# Patient Record
Sex: MALE | Race: WHITE | NOT HISPANIC OR LATINO | Employment: OTHER | URBAN - NONMETROPOLITAN AREA
[De-identification: names, ages, dates, MRNs, and addresses within clinical notes are randomized per-mention and may not be internally consistent; named-entity substitution may affect disease eponyms.]

---

## 2021-07-08 ENCOUNTER — OFFICE VISIT (OUTPATIENT)
Dept: CARDIOLOGY | Facility: CLINIC | Age: 71
End: 2021-07-08

## 2021-07-08 VITALS
WEIGHT: 205.2 LBS | BODY MASS INDEX: 27.79 KG/M2 | HEART RATE: 67 BPM | DIASTOLIC BLOOD PRESSURE: 74 MMHG | HEIGHT: 72 IN | SYSTOLIC BLOOD PRESSURE: 147 MMHG | OXYGEN SATURATION: 98 %

## 2021-07-08 DIAGNOSIS — R06.02 SHORTNESS OF BREATH: Primary | ICD-10-CM

## 2021-07-08 DIAGNOSIS — R60.0 LOWER EXTREMITY EDEMA: ICD-10-CM

## 2021-07-08 DIAGNOSIS — R94.31 ABNORMAL EKG: ICD-10-CM

## 2021-07-08 PROCEDURE — 93000 ELECTROCARDIOGRAM COMPLETE: CPT | Performed by: PHYSICIAN ASSISTANT

## 2021-07-08 PROCEDURE — 99204 OFFICE O/P NEW MOD 45 MIN: CPT | Performed by: PHYSICIAN ASSISTANT

## 2021-07-08 RX ORDER — DILTIAZEM HYDROCHLORIDE 180 MG/1
180 CAPSULE, COATED, EXTENDED RELEASE ORAL DAILY
COMMUNITY

## 2021-07-08 RX ORDER — LANOLIN ALCOHOL/MO/W.PET/CERES
1000 CREAM (GRAM) TOPICAL DAILY
COMMUNITY

## 2021-07-08 RX ORDER — ATORVASTATIN CALCIUM 40 MG/1
40 TABLET, FILM COATED ORAL DAILY
COMMUNITY

## 2021-07-08 RX ORDER — LAMOTRIGINE 150 MG/1
150 TABLET ORAL DAILY
COMMUNITY

## 2021-07-08 NOTE — PATIENT INSTRUCTIONS
Heart-Healthy Eating Plan  Heart-healthy meal planning includes:  · Eating less unhealthy fats.  · Eating more healthy fats.  · Making other changes in your diet.  Talk with your doctor or a diet specialist (dietitian) to create an eating plan that is right for you.  What is my plan?  Your doctor may recommend an eating plan that includes:  · Total fat: ______% or less of total calories a day.  · Saturated fat: ______% or less of total calories a day.  · Cholesterol: less than _________mg a day.  What are tips for following this plan?  Cooking  Avoid frying your food. Try to bake, boil, grill, or broil it instead. You can also reduce fat by:  · Removing the skin from poultry.  · Removing all visible fats from meats.  · Steaming vegetables in water or broth.  Meal planning    · At meals, divide your plate into four equal parts:  ? Fill one-half of your plate with vegetables and green salads.  ? Fill one-fourth of your plate with whole grains.  ? Fill one-fourth of your plate with lean protein foods.  · Eat 4-5 servings of vegetables per day. A serving of vegetables is:  ? 1 cup of raw or cooked vegetables.  ? 2 cups of raw leafy greens.  · Eat 4-5 servings of fruit per day. A serving of fruit is:  ? 1 medium whole fruit.  ? ¼ cup of dried fruit.  ? ½ cup of fresh, frozen, or canned fruit.  ? ½ cup of 100% fruit juice.  · Eat more foods that have soluble fiber. These are apples, broccoli, carrots, beans, peas, and barley. Try to get 20-30 g of fiber per day.  · Eat 4-5 servings of nuts, legumes, and seeds per week:  ? 1 serving of dried beans or legumes equals ½ cup after being cooked.  ? 1 serving of nuts is ¼ cup.  ? 1 serving of seeds equals 1 tablespoon.  General information  · Eat more home-cooked food. Eat less restaurant, buffet, and fast food.  · Limit or avoid alcohol.  · Limit foods that are high in starch and sugar.  · Avoid fried foods.  · Lose weight if you are overweight.  · Keep track of how much salt  (sodium) you eat. This is important if you have high blood pressure. Ask your doctor to tell you more about this.  · Try to add vegetarian meals each week.  Fats  · Choose healthy fats. These include olive oil and canola oil, flaxseeds, walnuts, almonds, and seeds.  · Eat more omega-3 fats. These include salmon, mackerel, sardines, tuna, flaxseed oil, and ground flaxseeds. Try to eat fish at least 2 times each week.  · Check food labels. Avoid foods with trans fats or high amounts of saturated fat.  · Limit saturated fats.  ? These are often found in animal products, such as meats, butter, and cream.  ? These are also found in plant foods, such as palm oil, palm kernel oil, and coconut oil.  · Avoid foods with partially hydrogenated oils in them. These have trans fats. Examples are stick margarine, some tub margarines, cookies, crackers, and other baked goods.  What foods can I eat?  Fruits  All fresh, canned (in natural juice), or frozen fruits.  Vegetables  Fresh or frozen vegetables (raw, steamed, roasted, or grilled). Green salads.  Grains  Most grains. Choose whole wheat and whole grains most of the time. Rice and pasta, including brown rice and pastas made with whole wheat.  Meats and other proteins  Lean, well-trimmed beef, veal, pork, and lamb. Chicken and turkey without skin. All fish and shellfish. Wild duck, rabbit, pheasant, and venison. Egg whites or low-cholesterol egg substitutes. Dried beans, peas, lentils, and tofu. Seeds and most nuts.  Dairy  Low-fat or nonfat cheeses, including ricotta and mozzarella. Skim or 1% milk that is liquid, powdered, or evaporated. Buttermilk that is made with low-fat milk. Nonfat or low-fat yogurt.  Fats and oils  Non-hydrogenated (trans-free) margarines. Vegetable oils, including soybean, sesame, sunflower, olive, peanut, safflower, corn, canola, and cottonseed. Salad dressings or mayonnaise made with a vegetable oil.  Beverages  Mineral water. Coffee and tea. Diet  carbonated beverages.  Sweets and desserts  Sherbet, gelatin, and fruit ice. Small amounts of dark chocolate.  Limit all sweets and desserts.  Seasonings and condiments  All seasonings and condiments.  The items listed above may not be a complete list of foods and drinks you can eat. Contact a dietitian for more options.  What foods should I avoid?  Fruits  Canned fruit in heavy syrup. Fruit in cream or butter sauce. Fried fruit. Limit coconut.  Vegetables  Vegetables cooked in cheese, cream, or butter sauce. Fried vegetables.  Grains  Breads that are made with saturated or trans fats, oils, or whole milk. Croissants. Sweet rolls. Donuts. High-fat crackers, such as cheese crackers.  Meats and other proteins  Fatty meats, such as hot dogs, ribs, sausage, gale, rib-eye roast or steak. High-fat deli meats, such as salami and bologna. Caviar. Domestic duck and goose. Organ meats, such as liver.  Dairy  Cream, sour cream, cream cheese, and creamed cottage cheese. Whole-milk cheeses. Whole or 2% milk that is liquid, evaporated, or condensed. Whole buttermilk. Cream sauce or high-fat cheese sauce. Yogurt that is made from whole milk.  Fats and oils  Meat fat, or shortening. Cocoa butter, hydrogenated oils, palm oil, coconut oil, palm kernel oil. Solid fats and shortenings, including gale fat, salt pork, lard, and butter. Nondairy cream substitutes. Salad dressings with cheese or sour cream.  Beverages  Regular sodas and juice drinks with added sugar.  Sweets and desserts  Frosting. Pudding. Cookies. Cakes. Pies. Milk chocolate or white chocolate. Buttered syrups. Full-fat ice cream or ice cream drinks.  The items listed above may not be a complete list of foods and drinks to avoid. Contact a dietitian for more information.  Summary  · Heart-healthy meal planning includes eating less unhealthy fats, eating more healthy fats, and making other changes in your diet.  · Eat a balanced diet. This includes fruits and  vegetables, low-fat or nonfat dairy, lean protein, nuts and legumes, whole grains, and heart-healthy oils and fats.  This information is not intended to replace advice given to you by your health care provider. Make sure you discuss any questions you have with your health care provider.  Document Revised: 02/21/2019 Document Reviewed: 01/25/2019  [employer] Patient Education © 2021 [employer]

## 2021-07-08 NOTE — PROGRESS NOTES
Subjective   Jevon Cotto is a 71 y.o. male     Chief Complaint   Patient presents with   • Establish Care   • Edema       HPI    Patient is a 71-year-old male that presents to the office for evaluation.  No history of coronary artery disease or structural heart disease.    Patient has been having symptoms.  He has not been complaining of any chest pain or pressure but does have a degree of dyspnea when trying to exert or do activity.  Even activities on a flat surface, he has been dyspneic.  He went to see primary and apparently had an abnormal EKG.  Was recommended to consider evaluation    He does not describe PND orthopnea.    He does not describe palpitating having dysrhythmic symptoms.  Otherwise appears stable              Patient brought in medicine list to appointment, it's been reviewed with patient and med list was updated in the chart.     Current Outpatient Medications   Medication Sig Dispense Refill   • atorvastatin (LIPITOR) 40 MG tablet Take 40 mg by mouth Daily.     • dilTIAZem CD (CARDIZEM CD) 180 MG 24 hr capsule Take 180 mg by mouth Daily.     • lamoTRIgine (LaMICtal) 150 MG tablet Take 150 mg by mouth Daily.     • vitamin B-12 (CYANOCOBALAMIN) 1000 MCG tablet Take 1,000 mcg by mouth Daily.       No current facility-administered medications for this visit.       Patient has no known allergies.    Past Medical History:   Diagnosis Date   • Hyperlipidemia    • Hypertension    • Kidney stones    • Seizures (CMS/HCC)    • Sleep apnea        Social History     Socioeconomic History   • Marital status:      Spouse name: Not on file   • Number of children: Not on file   • Years of education: Not on file   • Highest education level: Not on file   Tobacco Use   • Smoking status: Never Smoker   • Smokeless tobacco: Never Used   Substance and Sexual Activity   • Alcohol use: Yes     Comment: occas socially   • Drug use: Never   • Sexual activity: Defer       Family History   Problem Relation Age of  "Onset   • No Known Problems Mother    • No Known Problems Father        Review of Systems   Constitutional: Positive for fatigue. Negative for chills and fever.   HENT: Negative for congestion, rhinorrhea and sore throat.    Eyes: Positive for visual disturbance (glasses).   Respiratory: Positive for apnea (uses cpap) and shortness of breath. Negative for chest tightness.    Cardiovascular: Positive for leg swelling (L leg). Negative for chest pain and palpitations.   Gastrointestinal: Negative.    Endocrine: Negative.    Genitourinary: Negative.    Musculoskeletal: Positive for arthralgias and back pain. Negative for gait problem, neck pain and neck stiffness.   Skin: Negative.  Negative for rash and wound.   Allergic/Immunologic: Negative.  Negative for environmental allergies and food allergies.   Neurological: Positive for seizures (last seizure 02/15/21), weakness, numbness (feet) and headaches. Negative for dizziness and light-headedness.   Hematological: Bruises/bleeds easily.   Psychiatric/Behavioral: Positive for sleep disturbance.       Objective   Vitals:    07/08/21 1026   BP: 147/74   BP Location: Left arm   Patient Position: Sitting   Pulse: 67   SpO2: 98%   Weight: 93.1 kg (205 lb 3.2 oz)   Height: 182.9 cm (72\")      /74 (BP Location: Left arm, Patient Position: Sitting)   Pulse 67   Ht 182.9 cm (72\")   Wt 93.1 kg (205 lb 3.2 oz)   SpO2 98%   BMI 27.83 kg/m²     Lab Results (most recent)     None          Physical Exam  Vitals and nursing note reviewed.   Constitutional:       General: He is not in acute distress.     Appearance: Normal appearance. He is well-developed.   HENT:      Head: Normocephalic and atraumatic.   Eyes:      General: No scleral icterus.        Right eye: No discharge.         Left eye: No discharge.      Conjunctiva/sclera: Conjunctivae normal.   Neck:      Vascular: No carotid bruit.   Cardiovascular:      Rate and Rhythm: Normal rate and regular rhythm.      Heart " sounds: Normal heart sounds. No murmur heard.   No friction rub. No gallop.    Pulmonary:      Effort: Pulmonary effort is normal. No respiratory distress.      Breath sounds: Normal breath sounds. No wheezing or rales.   Chest:      Chest wall: No tenderness.   Musculoskeletal:      Right lower leg: No edema.      Left lower leg: No edema.   Skin:     General: Skin is warm and dry.      Coloration: Skin is not pale.      Findings: No erythema or rash.   Neurological:      Mental Status: He is alert and oriented to person, place, and time.      Cranial Nerves: No cranial nerve deficit.   Psychiatric:         Behavior: Behavior normal.         Procedure     ECG 12 Lead    Date/Time: 7/8/2021 10:18 AM  Performed by: Alphonso Carranza PA  Authorized by: Alphonso Carranza PA   Comparison: not compared with previous ECG   Comments: EKG demonstrates sinus rhythm at 68 bpm, right axis deviation, nonspecific IVCD, nonspecific ST abnormality in the inferior lateral leads                 Assessment/Plan     Problems Addressed this Visit        Cardiac and Vasculature    Abnormal EKG    Relevant Orders    Adult Transthoracic Echo Complete W/ Cont if Necessary Per Protocol    Stress Test With Myocardial Perfusion One Day       Pulmonary and Pneumonias    Shortness of breath - Primary    Relevant Orders    Adult Transthoracic Echo Complete W/ Cont if Necessary Per Protocol    Stress Test With Myocardial Perfusion One Day       Symptoms and Signs    Lower extremity edema    Relevant Orders    Adult Transthoracic Echo Complete W/ Cont if Necessary Per Protocol    Stress Test With Myocardial Perfusion One Day      Diagnoses       Codes Comments    Shortness of breath    -  Primary ICD-10-CM: R06.02  ICD-9-CM: 786.05     Lower extremity edema     ICD-10-CM: R60.0  ICD-9-CM: 782.3     Abnormal EKG     ICD-10-CM: R94.31  ICD-9-CM: 794.31         Recommendation  1.  Patient is a 71-year-old male with complaints of dyspnea and had a  degree of lower extremity edema with abnormal EKG.  I would like to schedule testing    2.  Stress test for an ischemia assessment    3.  With history of lower extremity and edema, dyspnea and abnormal EKG, echo to assess LV systolic and diastolic function assess valvular primaries etc.    4.  Otherwise we will see him back for follow-up after the above testing.  Follow-up with primary as scheduled        Advance Care Planning   ACP discussion was held with the patient during this visit. Patient has an advance directive (not in EMR), copy requested.         Electronically signed by:

## 2021-07-27 ENCOUNTER — HOSPITAL ENCOUNTER (OUTPATIENT)
Dept: CARDIOLOGY | Facility: HOSPITAL | Age: 71
Discharge: HOME | End: 2021-07-27

## 2021-07-27 DIAGNOSIS — R94.31 ABNORMAL EKG: ICD-10-CM

## 2021-07-27 DIAGNOSIS — R60.0 LOWER EXTREMITY EDEMA: ICD-10-CM

## 2021-07-27 DIAGNOSIS — R06.02 SHORTNESS OF BREATH: ICD-10-CM

## 2021-07-27 LAB
BH CV NUCLEAR PRIOR STUDY: 3
BH CV REST NUCLEAR ISOTOPE DOSE: 10 MCI
BH CV STRESS BP STAGE 1: NORMAL
BH CV STRESS COMMENTS STAGE 1: NORMAL
BH CV STRESS DOSE REGADENOSON STAGE 1: 0.4
BH CV STRESS DURATION MIN STAGE 1: 0
BH CV STRESS DURATION SEC STAGE 1: 10
BH CV STRESS HR STAGE 1: 80
BH CV STRESS NUCLEAR ISOTOPE DOSE: 30 MCI
BH CV STRESS PROTOCOL 1: NORMAL
BH CV STRESS RECOVERY BP: NORMAL MMHG
BH CV STRESS RECOVERY HR: 74 BPM
BH CV STRESS STAGE 1: 1
MAXIMAL PREDICTED HEART RATE: 149 BPM
PERCENT MAX PREDICTED HR: 53.69 %
STRESS BASELINE BP: NORMAL MMHG
STRESS BASELINE HR: 72 BPM
STRESS PERCENT HR: 63 %
STRESS POST PEAK BP: NORMAL MMHG
STRESS POST PEAK HR: 80 BPM
STRESS TARGET HR: 127 BPM

## 2021-07-27 PROCEDURE — 78452 HT MUSCLE IMAGE SPECT MULT: CPT

## 2021-07-27 PROCEDURE — 25010000002 REGADENOSON 0.4 MG/5ML SOLUTION: Performed by: INTERNAL MEDICINE

## 2021-07-27 PROCEDURE — 93018 CV STRESS TEST I&R ONLY: CPT | Performed by: INTERNAL MEDICINE

## 2021-07-27 PROCEDURE — 0 TECHNETIUM SESTAMIBI: Performed by: INTERNAL MEDICINE

## 2021-07-27 PROCEDURE — 93017 CV STRESS TEST TRACING ONLY: CPT

## 2021-07-27 PROCEDURE — A9500 TC99M SESTAMIBI: HCPCS | Performed by: INTERNAL MEDICINE

## 2021-07-27 PROCEDURE — 78452 HT MUSCLE IMAGE SPECT MULT: CPT | Performed by: INTERNAL MEDICINE

## 2021-07-27 PROCEDURE — 93016 CV STRESS TEST SUPVJ ONLY: CPT | Performed by: NURSE PRACTITIONER

## 2021-07-27 RX ADMIN — REGADENOSON 0.4 MG: 0.08 INJECTION, SOLUTION INTRAVENOUS at 13:36

## 2021-07-27 RX ADMIN — TECHNETIUM TC 99M SESTAMIBI 1 DOSE: 1 INJECTION INTRAVENOUS at 12:24

## 2021-07-27 RX ADMIN — TECHNETIUM TC 99M SESTAMIBI 1 DOSE: 1 INJECTION INTRAVENOUS at 13:36

## 2021-07-28 ENCOUNTER — TELEPHONE (OUTPATIENT)
Dept: CARDIOLOGY | Facility: CLINIC | Age: 71
End: 2021-07-28

## 2021-07-28 NOTE — TELEPHONE ENCOUNTER
Spoke with patient regarding results. Verbalized understanding and has no further questions at this time. YURI, CMA

## 2021-08-12 ENCOUNTER — APPOINTMENT (OUTPATIENT)
Dept: CARDIOLOGY | Facility: HOSPITAL | Age: 71
End: 2021-08-12

## 2021-08-13 ENCOUNTER — HOSPITAL ENCOUNTER (OUTPATIENT)
Dept: CARDIOLOGY | Facility: HOSPITAL | Age: 71
Discharge: HOME | End: 2021-08-13
Admitting: PHYSICIAN ASSISTANT

## 2021-08-13 DIAGNOSIS — R06.02 SHORTNESS OF BREATH: ICD-10-CM

## 2021-08-13 DIAGNOSIS — R94.31 ABNORMAL EKG: ICD-10-CM

## 2021-08-13 DIAGNOSIS — R60.0 LOWER EXTREMITY EDEMA: ICD-10-CM

## 2021-08-13 PROCEDURE — 93306 TTE W/DOPPLER COMPLETE: CPT | Performed by: INTERNAL MEDICINE

## 2021-08-13 PROCEDURE — 93306 TTE W/DOPPLER COMPLETE: CPT

## 2021-08-24 ENCOUNTER — APPOINTMENT (OUTPATIENT)
Dept: CARDIOLOGY | Facility: HOSPITAL | Age: 71
End: 2021-08-24

## 2021-08-25 LAB
BH CV ECHO MEAS - ACS: 2 CM
BH CV ECHO MEAS - AO MAX PG: 4 MMHG
BH CV ECHO MEAS - AO MEAN PG: 3 MMHG
BH CV ECHO MEAS - AO ROOT AREA (BSA CORRECTED): 1.5
BH CV ECHO MEAS - AO ROOT AREA: 8.3 CM^2
BH CV ECHO MEAS - AO ROOT DIAM: 3.3 CM
BH CV ECHO MEAS - AO V2 MAX: 100 CM/SEC
BH CV ECHO MEAS - AO V2 MEAN: 75.8 CM/SEC
BH CV ECHO MEAS - AO V2 VTI: 22.7 CM
BH CV ECHO MEAS - BSA(HAYCOCK): 2.2 M^2
BH CV ECHO MEAS - BSA: 2.2 M^2
BH CV ECHO MEAS - BZI_BMI: 27.8 KILOGRAMS/M^2
BH CV ECHO MEAS - BZI_METRIC_HEIGHT: 182.9 CM
BH CV ECHO MEAS - BZI_METRIC_WEIGHT: 93 KG
BH CV ECHO MEAS - EDV(CUBED): 139 ML
BH CV ECHO MEAS - EDV(MOD-SP4): 165 ML
BH CV ECHO MEAS - EDV(TEICH): 128.4 ML
BH CV ECHO MEAS - EF(CUBED): 69.9 %
BH CV ECHO MEAS - EF(MOD-SP4): 57.3 %
BH CV ECHO MEAS - EF(TEICH): 61.2 %
BH CV ECHO MEAS - EF_3D-VOL: 59 %
BH CV ECHO MEAS - ESV(CUBED): 41.8 ML
BH CV ECHO MEAS - ESV(MOD-SP4): 70.4 ML
BH CV ECHO MEAS - ESV(TEICH): 49.8 ML
BH CV ECHO MEAS - FS: 33 %
BH CV ECHO MEAS - IVS/LVPW: 0.86
BH CV ECHO MEAS - IVSD: 1.3 CM
BH CV ECHO MEAS - LA DIMENSION: 4.5 CM
BH CV ECHO MEAS - LA/AO: 1.4
BH CV ECHO MEAS - LV DIASTOLIC VOL/BSA (35-75): 76.6 ML/M^2
BH CV ECHO MEAS - LV IVRT: 0.1 SEC
BH CV ECHO MEAS - LV MASS(C)D: 306.2 GRAMS
BH CV ECHO MEAS - LV MASS(C)DI: 142.2 GRAMS/M^2
BH CV ECHO MEAS - LV SYSTOLIC VOL/BSA (12-30): 32.7 ML/M^2
BH CV ECHO MEAS - LVIDD: 5.2 CM
BH CV ECHO MEAS - LVIDS: 3.5 CM
BH CV ECHO MEAS - LVLD AP4: 8.9 CM
BH CV ECHO MEAS - LVLS AP4: 7.6 CM
BH CV ECHO MEAS - LVOT AREA (M): 4.5 CM^2
BH CV ECHO MEAS - LVOT AREA: 4.5 CM^2
BH CV ECHO MEAS - LVOT DIAM: 2.4 CM
BH CV ECHO MEAS - LVPWD: 1.5 CM
BH CV ECHO MEAS - MV A MAX VEL: 56.1 CM/SEC
BH CV ECHO MEAS - MV DEC SLOPE: 417 CM/SEC^2
BH CV ECHO MEAS - MV E MAX VEL: 91.3 CM/SEC
BH CV ECHO MEAS - MV E/A: 1.6
BH CV ECHO MEAS - RVDD: 2.9 CM
BH CV ECHO MEAS - SI(AO): 87.5 ML/M^2
BH CV ECHO MEAS - SI(CUBED): 45.2 ML/M^2
BH CV ECHO MEAS - SI(MOD-SP4): 43.9 ML/M^2
BH CV ECHO MEAS - SI(TEICH): 36.5 ML/M^2
BH CV ECHO MEAS - SV(AO): 188.3 ML
BH CV ECHO MEAS - SV(CUBED): 97.2 ML
BH CV ECHO MEAS - SV(MOD-SP4): 94.6 ML
BH CV ECHO MEAS - SV(TEICH): 78.5 ML
MAXIMAL PREDICTED HEART RATE: 149 BPM
STRESS TARGET HR: 127 BPM

## 2021-08-26 ENCOUNTER — TELEPHONE (OUTPATIENT)
Dept: CARDIOLOGY | Facility: CLINIC | Age: 71
End: 2021-08-26

## 2021-08-26 NOTE — TELEPHONE ENCOUNTER
Spoke with patient, confirmed normal results and patient has follow up appointment schedule and advised to keep it. Mary Kate Dailey RMA.     ----- Message from Maura Tyson LPN sent at 8/26/2021  8:17 AM EDT -----  ----- Message -----   From: Alphonso Carranza PA   Sent: 8/25/2021   9:25 AM EDT   To: Kary Escobar LPN     Routine follow-up      Adult Transthoracic Echo Complete W/ Cont if Necessary Per Protocol  Order: [id number]  Status:  Final result   Visible to patient:  No (not released) Dx:  Shortness of breath; Lower extremity ...  1 Result Note  Details      Reading Physician Reading Date Result Priority  Gold Manriquez MD  [phone] 8/13/2021 Routine    Result Text  This study is technically limited by poor parasternal acoustic windows but is felt suitable for interpretation.     1.  LV size, function, and wall motion are grossly normal.  Visually estimated ejection fraction is 50 to 55%.  3D ejection fraction is 59%.  Mild concentric left ventricular hypertrophy with grade 2 diastolic dysfunction.  Mild to moderate left atrial enlargement.  Right heart chambers are normal.  No septal defect or intracavitary mass or thrombus.     2.  The mitral valve is morphologically normal with no mitral stenosis and trivial to mild MR.  The aortic valve is structurally normal with no AAS or AI.  There is physiologic TR from a normal-appearing valve.     3.  No pericardial or great vessel pathology.     4.  Pulmonary artery pressures cannot be calculated.      ----- Message -----  From: Kary Escobar LPN  Sent: 8/25/2021  10:26 AM EDT  To: Maura Tyson LPN      ----- Message -----  From: Alphonso Carranza PA  Sent: 8/25/2021   9:25 AM EDT  To: Kary Escobar LPN    Routine follow-up

## 2021-09-15 ENCOUNTER — OFFICE VISIT (OUTPATIENT)
Dept: CARDIOLOGY | Facility: CLINIC | Age: 71
End: 2021-09-15

## 2021-09-15 VITALS
HEIGHT: 72 IN | HEART RATE: 70 BPM | WEIGHT: 206 LBS | OXYGEN SATURATION: 97 % | SYSTOLIC BLOOD PRESSURE: 168 MMHG | DIASTOLIC BLOOD PRESSURE: 93 MMHG | BODY MASS INDEX: 27.9 KG/M2

## 2021-09-15 DIAGNOSIS — G40.909 SEIZURE DISORDER (HCC): Primary | ICD-10-CM

## 2021-09-15 DIAGNOSIS — R06.02 SHORTNESS OF BREATH: ICD-10-CM

## 2021-09-15 DIAGNOSIS — R60.0 LOWER EXTREMITY EDEMA: ICD-10-CM

## 2021-09-15 PROCEDURE — 99214 OFFICE O/P EST MOD 30 MIN: CPT | Performed by: PHYSICIAN ASSISTANT

## 2021-09-15 NOTE — PROGRESS NOTES
Problem list     Subjective   Jevon Cotto is a 71 y.o. male     Chief Complaint   Patient presents with   • Testing follow up     Stress and Echo on 8/13/21       HPI    Patient is a 71-year-old male who presents to the office for routine assessment and follow-up.  He initially presented to the office in the setting of significant exertional dyspnea and testing was ordered to rule out the potential cardiac etiology.  Patient describes dyspnea when exerting for extended periods.  Stress test with no evidence of ischemia preserved LV function.  Echo suggested good LV function with grade 2 diastolic dysfunction no significant valvular disease no effusion.    He has done remarkably well.  No chest pain or pressure.  He has a degree of mild dyspnea when exerting.  It is not severe but he was to exert for an extended level on a flat surface, he can be short of breath.  This has progressed over a period of time.  He does not describe PND orthopnea.    He does not palpitate or have dysrhythmic symptoms.  He does have a mild degree of lower extremity edema but has injury to his lower extremity but otherwise is feeling well        Current Outpatient Medications on File Prior to Visit   Medication Sig Dispense Refill   • atorvastatin (LIPITOR) 40 MG tablet Take 40 mg by mouth Daily.     • dilTIAZem CD (CARDIZEM CD) 180 MG 24 hr capsule Take 180 mg by mouth Daily.     • lamoTRIgine (LaMICtal) 150 MG tablet Take 150 mg by mouth Daily.     • vitamin B-12 (CYANOCOBALAMIN) 1000 MCG tablet Take 1,000 mcg by mouth Daily.       No current facility-administered medications on file prior to visit.       Patient has no known allergies.    Past Medical History:   Diagnosis Date   • Hyperlipidemia    • Hypertension    • Kidney stones    • Seizures (CMS/HCC)    • Sleep apnea        Social History     Socioeconomic History   • Marital status:      Spouse name: Not on file   • Number of children: Not on file   • Years of education:  "Not on file   • Highest education level: Not on file   Tobacco Use   • Smoking status: Never Smoker   • Smokeless tobacco: Never Used   Substance and Sexual Activity   • Alcohol use: Yes     Comment: occas socially   • Drug use: Never   • Sexual activity: Defer       Family History   Problem Relation Age of Onset   • No Known Problems Mother    • No Known Problems Father        Review of Systems   Constitutional: Positive for fatigue. Negative for chills and fever.   HENT: Negative.  Negative for congestion, sinus pressure and sore throat.    Eyes: Positive for visual disturbance (glasses).   Respiratory: Positive for apnea and shortness of breath (at times). Negative for chest tightness.    Cardiovascular: Positive for leg swelling. Negative for chest pain and palpitations.   Gastrointestinal: Positive for constipation (at times). Negative for abdominal pain, blood in stool, diarrhea, nausea and vomiting.   Endocrine: Negative for cold intolerance and heat intolerance.   Genitourinary: Negative.  Negative for dysuria, frequency, hematuria and urgency.   Musculoskeletal: Negative.  Negative for arthralgias, back pain and neck pain.   Skin: Positive for wound (lower right leg).   Allergic/Immunologic: Negative.  Negative for environmental allergies and food allergies.   Neurological: Positive for light-headedness (when up moving around at times). Negative for dizziness and syncope.   Hematological: Bruises/bleeds easily.   Psychiatric/Behavioral: Positive for sleep disturbance (CPAP, denies waking wtih soa or cp).       Objective   Vitals:    09/15/21 0932   BP: 168/93   BP Location: Left arm   Patient Position: Sitting   Pulse: 70   SpO2: 97%   Weight: 93.4 kg (206 lb)   Height: 182.9 cm (72\")      /93 (BP Location: Left arm, Patient Position: Sitting)   Pulse 70   Ht 182.9 cm (72\")   Wt 93.4 kg (206 lb)   SpO2 97%   BMI 27.94 kg/m²     Lab Results (most recent)     None          Physical Exam  Vitals and " nursing note reviewed.   Constitutional:       General: He is not in acute distress.     Appearance: Normal appearance. He is well-developed.   HENT:      Head: Normocephalic and atraumatic.   Eyes:      General: No scleral icterus.        Right eye: No discharge.         Left eye: No discharge.      Conjunctiva/sclera: Conjunctivae normal.   Neck:      Vascular: No carotid bruit.   Cardiovascular:      Rate and Rhythm: Normal rate and regular rhythm.      Heart sounds: Normal heart sounds. No murmur heard.   No friction rub. No gallop.    Pulmonary:      Effort: Pulmonary effort is normal. No respiratory distress.      Breath sounds: Normal breath sounds. No wheezing or rales.   Chest:      Chest wall: No tenderness.   Musculoskeletal:      Right lower leg: Edema present.      Left lower leg: No edema.   Skin:     General: Skin is warm and dry.      Coloration: Skin is not pale.      Findings: No erythema or rash.   Neurological:      Mental Status: He is alert and oriented to person, place, and time.      Cranial Nerves: No cranial nerve deficit.   Psychiatric:         Behavior: Behavior normal.         Procedure   Procedures       Assessment/Plan     Problems Addressed this Visit        Neuro    Seizure disorder (CMS/[facility]) - Primary    Relevant Orders    Ambulatory Referral to Neurology       Pulmonary and Pneumonias    Shortness of breath       Symptoms and Signs    Lower extremity edema      Diagnoses       Codes Comments    Seizure disorder (CMS/[facility])    -  Primary ICD-10-CM: G40.909  ICD-9-CM: 345.90     Lower extremity edema     ICD-10-CM: R60.0  ICD-9-CM: 782.3     Shortness of breath     ICD-10-CM: R06.02  ICD-9-CM: 786.05           Recommendation  1.  Patient is a 71-year-old male who presents to the office for evaluation that is doing well with stress that showed no evidence of ischemia.  Grade 2 diastolic dysfunction otherwise largely normal echo.  He has minimal to mild lower extremity edema.  Patient  had an injury to his lower extremity edema in which she had a significant laceration requiring repair.  He developed cellulitis and required IV antibiotics.  He has no severe lower extremity edema but mild edema that seems to be attributable to other causes.  We can monitor for now    2.  Patient has history of seizure disorder would like referral to local neurology.  Per his request, we will make referral to Dr. Rivera    3.  Otherwise dyspnea is mild.  We did discuss possible pulmonary evaluation but he will monitor symptoms for now.  We will see him back for follow-up in 6 months to year or sooner if symptoms were to progress or change as discussed.  Follow-up with primary as scheduled         Patient did not bring med list or medicine bottles to appointment, med list has been reviewed and updated based on patient's knowledge of their meds.     Advance Care Planning   ACP discussion was held with the patient during this visit. Patient has an advance directive (not in EMR), copy requested.         Electronically signed by:

## 2021-09-15 NOTE — PATIENT INSTRUCTIONS

## 2022-09-15 ENCOUNTER — OFFICE VISIT (OUTPATIENT)
Dept: CARDIOLOGY | Facility: CLINIC | Age: 72
End: 2022-09-15

## 2022-09-15 VITALS
BODY MASS INDEX: 26.01 KG/M2 | DIASTOLIC BLOOD PRESSURE: 87 MMHG | SYSTOLIC BLOOD PRESSURE: 151 MMHG | WEIGHT: 192 LBS | HEART RATE: 74 BPM | HEIGHT: 72 IN | OXYGEN SATURATION: 98 %

## 2022-09-15 DIAGNOSIS — R06.02 SHORTNESS OF BREATH: ICD-10-CM

## 2022-09-15 DIAGNOSIS — E78.5 DYSLIPIDEMIA: ICD-10-CM

## 2022-09-15 DIAGNOSIS — R60.0 LOWER EXTREMITY EDEMA: Primary | ICD-10-CM

## 2022-09-15 PROCEDURE — 99213 OFFICE O/P EST LOW 20 MIN: CPT | Performed by: PHYSICIAN ASSISTANT

## 2022-09-15 PROCEDURE — 93000 ELECTROCARDIOGRAM COMPLETE: CPT | Performed by: PHYSICIAN ASSISTANT

## 2022-09-15 RX ORDER — DIPHENOXYLATE HYDROCHLORIDE AND ATROPINE SULFATE 2.5; .025 MG/1; MG/1
TABLET ORAL 3 TIMES DAILY
COMMUNITY

## 2022-09-15 RX ORDER — MELATONIN
1000 DAILY
COMMUNITY

## 2022-09-15 NOTE — PROGRESS NOTES
"Problem list     Subjective   Jevon Cotto is a 72 y.o. male     Chief Complaint   Patient presents with   • Follow-up     1 year   • Seizure disorder   • Shortness of Breath   • Edema     L knee       HPI    Patient is a 72-year-old male who presents back to the office for follow-up.  He was seen last year and apparently had testing to include stress test which was negative for ischemia and echocardiogram which was largely unremarkable with exception of mild mitral regurgitation and grade 2 diastolic dysfunction.  LV function was estimated as normal    Patient has felt remarkably well without any chest pain or pressure at all.  He can be mildly dyspneic and has issues when he \"overexerts\".  Otherwise, his dyspnea is very minimal.  He does not describe any PND or orthopnea.    He does not sense palpitations.  He does not describe any dysrhythmic symptoms.  Otherwise feels well at this point      Current Outpatient Medications on File Prior to Visit   Medication Sig Dispense Refill   • atorvastatin (LIPITOR) 40 MG tablet Take 40 mg by mouth Daily.     • cholecalciferol (VITAMIN D3) 25 MCG (1000 UT) tablet Take 1,000 Units by mouth Daily.     • dilTIAZem CD (CARDIZEM CD) 180 MG 24 hr capsule Take 180 mg by mouth Daily.     • lamoTRIgine (LaMICtal) 150 MG tablet Take 150 mg by mouth Daily.     • multivitamin (multivitamin) tablet tablet Take  by mouth 3 (Three) Times a Day.     • vitamin B-12 (CYANOCOBALAMIN) 1000 MCG tablet Take 1,000 mcg by mouth Daily.       No current facility-administered medications on file prior to visit.       Patient has no known allergies.    Past Medical History:   Diagnosis Date   • Hyperlipidemia    • Hypertension    • Kidney stones    • Seizures (HCC)    • Sleep apnea        Social History     Socioeconomic History   • Marital status:    Tobacco Use   • Smoking status: Never Smoker   • Smokeless tobacco: Never Used   Vaping Use   • Vaping Use: Never used   Substance and Sexual " "Activity   • Alcohol use: Yes     Comment: occas socially   • Drug use: Never   • Sexual activity: Defer       Family History   Problem Relation Age of Onset   • No Known Problems Mother    • No Known Problems Father        Review of Systems   Constitutional: Negative.  Negative for appetite change, chills, fatigue, fever and unexpected weight change.   HENT: Negative.  Negative for congestion, ear pain, sinus pressure, sinus pain, sneezing and sore throat.    Eyes: Negative.  Negative for pain, discharge, redness, itching and visual disturbance.   Respiratory: Positive for shortness of breath. Negative for cough, chest tightness and wheezing.    Cardiovascular: Negative.  Negative for chest pain, palpitations and leg swelling (L knee).   Gastrointestinal: Positive for nausea (W/seizures). Negative for abdominal pain, blood in stool, constipation, diarrhea, rectal pain and vomiting.   Endocrine: Negative.    Genitourinary: Negative.  Negative for hematuria.   Musculoskeletal: Negative.  Negative for back pain, joint swelling, neck pain and neck stiffness.   Skin: Negative.    Allergic/Immunologic: Negative.    Neurological: Positive for seizures. Negative for dizziness, syncope, weakness, light-headedness and headaches.   Hematological: Negative.    Psychiatric/Behavioral: Positive for sleep disturbance (Pt wears a CPAP but only gets about 4 hours of restful sleep).       Objective   Vitals:    09/15/22 0944   BP: 151/87   BP Location: Left arm   Patient Position: Sitting   Cuff Size: Adult   Pulse: 74   SpO2: 98%   Weight: 87.1 kg (192 lb)   Height: 182.9 cm (72\")      /87 (BP Location: Left arm, Patient Position: Sitting, Cuff Size: Adult)   Pulse 74   Ht 182.9 cm (72\")   Wt 87.1 kg (192 lb)   SpO2 98%   BMI 26.04 kg/m²     Lab Results (most recent)     None          Physical Exam  Vitals and nursing note reviewed.   Constitutional:       General: He is not in acute distress.     Appearance: Normal " appearance. He is well-developed.   HENT:      Head: Normocephalic and atraumatic.   Eyes:      General: No scleral icterus.        Right eye: No discharge.         Left eye: No discharge.      Conjunctiva/sclera: Conjunctivae normal.   Neck:      Vascular: No carotid bruit.   Cardiovascular:      Rate and Rhythm: Normal rate and regular rhythm.      Heart sounds: Normal heart sounds. No murmur heard.    No friction rub. No gallop.   Pulmonary:      Effort: Pulmonary effort is normal. No respiratory distress.      Breath sounds: Normal breath sounds. No wheezing or rales.   Chest:      Chest wall: No tenderness.   Musculoskeletal:      Right lower leg: No edema.      Left lower leg: No edema.   Skin:     General: Skin is warm and dry.      Coloration: Skin is not pale.      Findings: No erythema or rash.   Neurological:      Mental Status: He is alert and oriented to person, place, and time.      Cranial Nerves: No cranial nerve deficit.   Psychiatric:         Behavior: Behavior normal.         Procedure     ECG 12 Lead    Date/Time: 9/15/2022 9:45 AM  Performed by: Alphonso Carranza PA  Authorized by: Alphonso Carranza PA   Comparison: compared with previous ECG from 7/8/2021  Comments: EKG demonstrates sinus bradycardia 59 bpm with first-degree block at 216 ms and no acute ST changes               Assessment & Plan     Problems Addressed this Visit        Cardiac and Vasculature    Dyslipidemia       Pulmonary and Pneumonias    Shortness of breath       Symptoms and Signs    Lower extremity edema - Primary      Diagnoses       Codes Comments    Lower extremity edema    -  Primary ICD-10-CM: R60.0  ICD-9-CM: 782.3     Shortness of breath     ICD-10-CM: R06.02  ICD-9-CM: 786.05     Dyslipidemia     ICD-10-CM: E78.5  ICD-9-CM: 272.4         Recommendation  1.  Patient is a 72-year-old male who presents to the office for evaluation.  Patient feels remarkably well at this point.  Patient does not complain chest pain  or pressure.  No ischemic symptoms.  For now, I would recommend continue medical management    2.  Patient with mild dyspnea but it is only present with high levels of activity.  With good LV function and no evidence of ischemia, feel is reasonable to continue current treatment.    3.  Patient with dyslipidemia currently on statin therapy.  We will continue at this point.  In the absence of symptoms, recommend follow-up in 1 year or sooner as symptoms discussed.  Follow-up with primary as scheduled                Electronically signed by:

## 2023-01-30 ENCOUNTER — TELEPHONE (OUTPATIENT)
Dept: CARDIOLOGY | Facility: CLINIC | Age: 73
End: 2023-01-30
Payer: MEDICARE

## 2023-01-30 NOTE — TELEPHONE ENCOUNTER
Clotilde from [facility] left message to report patient experienced dizziness and vomiting this morning. She is treating him for knee replacement. She checked his heart rate and felt it skip a couple times. She is concerned he may need an EKG.    Left message for Clotilde to call with blood pressure and marrufo rate.     Called patient. He states he may have a stomach bug but has experienced dizziness and nausea since starting pain pills prescribed for knee replacement. He reports BP/HR were normal when checked earlier today. He states he is doing well. He denies shortness of breath, chest pain or palpitations. He does not feel he needs an EKG and would not be able to come in for one anyway. He will he will call with any concerns. Message routed to Alphonso Carranza as LILI.

## 2023-09-18 ENCOUNTER — OFFICE VISIT (OUTPATIENT)
Dept: CARDIOLOGY | Facility: CLINIC | Age: 73
End: 2023-09-18
Payer: MEDICARE

## 2023-09-18 VITALS
WEIGHT: 190 LBS | DIASTOLIC BLOOD PRESSURE: 75 MMHG | HEART RATE: 75 BPM | SYSTOLIC BLOOD PRESSURE: 129 MMHG | BODY MASS INDEX: 25.73 KG/M2 | HEIGHT: 72 IN | OXYGEN SATURATION: 96 %

## 2023-09-18 DIAGNOSIS — R94.31 ABNORMAL EKG: ICD-10-CM

## 2023-09-18 DIAGNOSIS — R60.0 LOWER EXTREMITY EDEMA: Primary | ICD-10-CM

## 2023-09-18 DIAGNOSIS — R06.02 SHORTNESS OF BREATH: ICD-10-CM

## 2023-09-18 PROCEDURE — 93000 ELECTROCARDIOGRAM COMPLETE: CPT | Performed by: PHYSICIAN ASSISTANT

## 2023-09-18 PROCEDURE — 99214 OFFICE O/P EST MOD 30 MIN: CPT | Performed by: PHYSICIAN ASSISTANT

## 2023-09-18 RX ORDER — LOSARTAN POTASSIUM AND HYDROCHLOROTHIAZIDE 12.5; 5 MG/1; MG/1
1 TABLET ORAL DAILY
Qty: 30 TABLET | Refills: 5 | Status: SHIPPED | OUTPATIENT
Start: 2023-09-18

## 2023-09-18 NOTE — LETTER
September 18, 2023       No Recipients    Patient: Jevon Cotto   YOB: 1950   Date of Visit: 9/18/2023       Dear RADHA Roberts    Jevon Cotto was in my office today. Below is a copy of my note.    If you have questions, please do not hesitate to call me. I look forward to following Jevon along with you.         Sincerely,        JIMY Cali        CC:   No Recipients    Problem list     Subjective  Jevon Cotto is a 73 y.o. male     Chief Complaint   Patient presents with   • Follow-up     YEARLY   Problem list  1.  Low risk stress test August 2021  2.  Preserved systolic function  3.  Grade 2 diastolic dysfunction  4.  Lower extremity edema  5.  Hypertension  6.  Dyslipidemia    HPI    Patient is a 83-year-old male who presents back to the office for routine cardiac follow-up.  He has done remarkably well since his last visit.  He does not describe chest pain or pressure.    He does have a degree of dyspnea when trying to exert or do activity.  It is not severe.  It is noticeable when trying to exert or do activity for an extended period.  He does not describe PND or orthopnea.    He does describe lower extremity edema.  He has felt lower extremity edema that has worsened.  He seems to be worse as the day progresses.  Otherwise, he has done well.  He feels well at this time.      Current Outpatient Medications on File Prior to Visit   Medication Sig Dispense Refill   • atorvastatin (LIPITOR) 40 MG tablet Take 1 tablet by mouth Daily.     • cholecalciferol (VITAMIN D3) 25 MCG (1000 UT) tablet Take 1 tablet by mouth Daily.     • lamoTRIgine (LaMICtal) 150 MG tablet Take 1 tablet by mouth Daily.     • multivitamin (THERAGRAN) tablet tablet Take  by mouth 3 (Three) Times a Day.     • vitamin B-12 (CYANOCOBALAMIN) 1000 MCG tablet Take 1 tablet by mouth Daily.     • [DISCONTINUED] dilTIAZem CD (CARDIZEM CD) 180 MG 24 hr capsule Take 1 capsule by mouth Daily.       No current  "facility-administered medications on file prior to visit.       Patient has no known allergies.    Past Medical History:   Diagnosis Date   • Hyperlipidemia    • Hypertension    • Kidney stones    • Seizures    • Sleep apnea        Social History     Socioeconomic History   • Marital status:    Tobacco Use   • Smoking status: Never   • Smokeless tobacco: Never   Vaping Use   • Vaping Use: Never used   Substance and Sexual Activity   • Alcohol use: Yes     Comment: occas socially   • Drug use: Never   • Sexual activity: Defer       Family History   Problem Relation Age of Onset   • No Known Problems Mother    • No Known Problems Father        Review of Systems   HENT: Negative.     Eyes:  Negative for visual disturbance.   Respiratory:  Positive for apnea and shortness of breath. Negative for cough, chest tightness and wheezing.    Cardiovascular:  Positive for leg swelling. Negative for chest pain and palpitations.   Gastrointestinal: Negative.  Negative for blood in stool.   Endocrine: Negative.    Genitourinary: Negative.  Negative for hematuria.   Musculoskeletal: Negative.    Skin: Negative.  Negative for color change, rash and wound.   Allergic/Immunologic: Negative.    Neurological:  Negative for dizziness, syncope, weakness, light-headedness, numbness and headaches.   Hematological: Negative.  Does not bruise/bleed easily.   Psychiatric/Behavioral: Negative.  Negative for sleep disturbance.      Objective  Vitals:    09/18/23 0916   BP: 129/75   BP Location: Left arm   Patient Position: Sitting   Cuff Size: Adult   Pulse: 75   SpO2: 96%   Weight: 86.2 kg (190 lb)   Height: 182.9 cm (72\")      /75 (BP Location: Left arm, Patient Position: Sitting, Cuff Size: Adult)   Pulse 75   Ht 182.9 cm (72\")   Wt 86.2 kg (190 lb)   SpO2 96%   BMI 25.77 kg/m²     Lab Results (most recent)       None            Physical Exam  Vitals and nursing note reviewed.   Constitutional:       General: He is not in " acute distress.     Appearance: Normal appearance. He is well-developed.   HENT:      Head: Normocephalic and atraumatic.   Eyes:      General: No scleral icterus.        Right eye: No discharge.         Left eye: No discharge.      Conjunctiva/sclera: Conjunctivae normal.   Neck:      Vascular: No carotid bruit.   Cardiovascular:      Rate and Rhythm: Normal rate and regular rhythm.      Heart sounds: Normal heart sounds. No murmur heard.    No friction rub. No gallop.   Pulmonary:      Effort: Pulmonary effort is normal. No respiratory distress.      Breath sounds: Normal breath sounds. No wheezing or rales.   Chest:      Chest wall: No tenderness.   Musculoskeletal:      Right lower leg: Edema present.      Left lower leg: Edema present.   Skin:     General: Skin is warm and dry.      Coloration: Skin is not pale.      Findings: No erythema or rash.   Neurological:      Mental Status: He is alert and oriented to person, place, and time.      Cranial Nerves: No cranial nerve deficit.   Psychiatric:         Behavior: Behavior normal.       Procedure    ECG 12 Lead    Date/Time: 9/18/2023 9:09 AM  Performed by: Alphonso Carranza PA  Authorized by: Alphonso Carranza PA   Comparison: compared with previous ECG from 9/15/2022  Comments: EKG demonstrates sinus rhythm at 69 bpm, first-degree AV block at 234 ms, possible septal MI age-indeterminate with nonspecific ST abnormality at baseline           Assessment & Plan    Problems Addressed this Visit          Cardiac and Vasculature    Abnormal EKG    Relevant Orders    Stress Test With Myocardial Perfusion One Day       Pulmonary and Pneumonias    Shortness of breath    Relevant Orders    Stress Test With Myocardial Perfusion One Day       Symptoms and Signs    Lower extremity edema - Primary    Relevant Orders    Stress Test With Myocardial Perfusion One Day     Diagnoses         Codes Comments    Lower extremity edema    -  Primary ICD-10-CM: R60.0  ICD-9-CM: 782.3      Shortness of breath     ICD-10-CM: R06.02  ICD-9-CM: 786.05     Abnormal EKG     ICD-10-CM: R94.31  ICD-9-CM: 794.31             Recommendation  1.  Patient is a 73-year-old male who presents to the office for evaluation.  Patient has had lower extremity edema.  Some of this could be related to venous insufficiency.  Patient is on calcium channel blocker as well.  Also, he has grade 2 diastolic dysfunction and is on a calcium channel blocker.  I would like to stop diltiazem.  He has first-degree AV block on EKG anyway.  I am putting him on losartan with hydrochlorothiazide combination and want him to call back in 1 to 2 weeks in regards to symptom check.    2.  Because of EKG change in level of dyspnea with an EKG change, I am scheduling stress test to evaluate and rule out ischemia as contributing factor.    3.  Otherwise, he appears to be doing well.  He appears stable.  We will see him back for follow-up after testing and recommend further.  Follow-up with primary as scheduled.         Patient did not bring med list or medicine bottles to appointment, med list has been reviewed and updated based on patient's knowledge of their meds.      Advance Care Planning   ACP discussion was declined by the patient. Patient has an advance directive (not in EMR), copy requested.      Electronically signed by:

## 2023-09-18 NOTE — PROGRESS NOTES
Problem list     Subjective   Jevon Cotto is a 73 y.o. male     Chief Complaint   Patient presents with    Follow-up     YEARLY   Problem list  1.  Low risk stress test August 2021  2.  Preserved systolic function  3.  Grade 2 diastolic dysfunction  4.  Lower extremity edema  5.  Hypertension  6.  Dyslipidemia    HPI    Patient is a 83-year-old male who presents back to the office for routine cardiac follow-up.  He has done remarkably well since his last visit.  He does not describe chest pain or pressure.    He does have a degree of dyspnea when trying to exert or do activity.  It is not severe.  It is noticeable when trying to exert or do activity for an extended period.  He does not describe PND or orthopnea.    He does describe lower extremity edema.  He has felt lower extremity edema that has worsened.  He seems to be worse as the day progresses.  Otherwise, he has done well.  He feels well at this time.      Current Outpatient Medications on File Prior to Visit   Medication Sig Dispense Refill    atorvastatin (LIPITOR) 40 MG tablet Take 1 tablet by mouth Daily.      cholecalciferol (VITAMIN D3) 25 MCG (1000 UT) tablet Take 1 tablet by mouth Daily.      lamoTRIgine (LaMICtal) 150 MG tablet Take 1 tablet by mouth Daily.      multivitamin (THERAGRAN) tablet tablet Take  by mouth 3 (Three) Times a Day.      vitamin B-12 (CYANOCOBALAMIN) 1000 MCG tablet Take 1 tablet by mouth Daily.      [DISCONTINUED] dilTIAZem CD (CARDIZEM CD) 180 MG 24 hr capsule Take 1 capsule by mouth Daily.       No current facility-administered medications on file prior to visit.       Patient has no known allergies.    Past Medical History:   Diagnosis Date    Hyperlipidemia     Hypertension     Kidney stones     Seizures     Sleep apnea        Social History     Socioeconomic History    Marital status:    Tobacco Use    Smoking status: Never    Smokeless tobacco: Never   Vaping Use    Vaping Use: Never used   Substance and Sexual  "Activity    Alcohol use: Yes     Comment: occas socially    Drug use: Never    Sexual activity: Defer       Family History   Problem Relation Age of Onset    No Known Problems Mother     No Known Problems Father        Review of Systems   HENT: Negative.     Eyes:  Negative for visual disturbance.   Respiratory:  Positive for apnea and shortness of breath. Negative for cough, chest tightness and wheezing.    Cardiovascular:  Positive for leg swelling. Negative for chest pain and palpitations.   Gastrointestinal: Negative.  Negative for blood in stool.   Endocrine: Negative.    Genitourinary: Negative.  Negative for hematuria.   Musculoskeletal: Negative.    Skin: Negative.  Negative for color change, rash and wound.   Allergic/Immunologic: Negative.    Neurological:  Negative for dizziness, syncope, weakness, light-headedness, numbness and headaches.   Hematological: Negative.  Does not bruise/bleed easily.   Psychiatric/Behavioral: Negative.  Negative for sleep disturbance.      Objective   Vitals:    09/18/23 0916   BP: 129/75   BP Location: Left arm   Patient Position: Sitting   Cuff Size: Adult   Pulse: 75   SpO2: 96%   Weight: 86.2 kg (190 lb)   Height: 182.9 cm (72\")      /75 (BP Location: Left arm, Patient Position: Sitting, Cuff Size: Adult)   Pulse 75   Ht 182.9 cm (72\")   Wt 86.2 kg (190 lb)   SpO2 96%   BMI 25.77 kg/m²     Lab Results (most recent)       None            Physical Exam  Vitals and nursing note reviewed.   Constitutional:       General: He is not in acute distress.     Appearance: Normal appearance. He is well-developed.   HENT:      Head: Normocephalic and atraumatic.   Eyes:      General: No scleral icterus.        Right eye: No discharge.         Left eye: No discharge.      Conjunctiva/sclera: Conjunctivae normal.   Neck:      Vascular: No carotid bruit.   Cardiovascular:      Rate and Rhythm: Normal rate and regular rhythm.      Heart sounds: Normal heart sounds. No murmur " heard.    No friction rub. No gallop.   Pulmonary:      Effort: Pulmonary effort is normal. No respiratory distress.      Breath sounds: Normal breath sounds. No wheezing or rales.   Chest:      Chest wall: No tenderness.   Musculoskeletal:      Right lower leg: Edema present.      Left lower leg: Edema present.   Skin:     General: Skin is warm and dry.      Coloration: Skin is not pale.      Findings: No erythema or rash.   Neurological:      Mental Status: He is alert and oriented to person, place, and time.      Cranial Nerves: No cranial nerve deficit.   Psychiatric:         Behavior: Behavior normal.       Procedure     ECG 12 Lead    Date/Time: 9/18/2023 9:09 AM  Performed by: Alphonso Carranza PA  Authorized by: Alphonso Carranza PA   Comparison: compared with previous ECG from 9/15/2022  Comments: EKG demonstrates sinus rhythm at 69 bpm, first-degree AV block at 234 ms, possible septal MI age-indeterminate with nonspecific ST abnormality at baseline           Assessment & Plan     Problems Addressed this Visit          Cardiac and Vasculature    Abnormal EKG    Relevant Orders    Stress Test With Myocardial Perfusion One Day       Pulmonary and Pneumonias    Shortness of breath    Relevant Orders    Stress Test With Myocardial Perfusion One Day       Symptoms and Signs    Lower extremity edema - Primary    Relevant Orders    Stress Test With Myocardial Perfusion One Day     Diagnoses         Codes Comments    Lower extremity edema    -  Primary ICD-10-CM: R60.0  ICD-9-CM: 782.3     Shortness of breath     ICD-10-CM: R06.02  ICD-9-CM: 786.05     Abnormal EKG     ICD-10-CM: R94.31  ICD-9-CM: 794.31             Recommendation  1.  Patient is a 73-year-old male who presents to the office for evaluation.  Patient has had lower extremity edema.  Some of this could be related to venous insufficiency.  Patient is on calcium channel blocker as well.  Also, he has grade 2 diastolic dysfunction and is on a calcium  channel blocker.  I would like to stop diltiazem.  He has first-degree AV block on EKG anyway.  I am putting him on losartan with hydrochlorothiazide combination and want him to call back in 1 to 2 weeks in regards to symptom check.    2.  Because of EKG change in level of dyspnea with an EKG change, I am scheduling stress test to evaluate and rule out ischemia as contributing factor.    3.  Otherwise, he appears to be doing well.  He appears stable.  We will see him back for follow-up after testing and recommend further.  Follow-up with primary as scheduled.         Patient did not bring med list or medicine bottles to appointment, med list has been reviewed and updated based on patient's knowledge of their meds.      Advance Care Planning   ACP discussion was declined by the patient. Patient has an advance directive (not in EMR), copy requested.      Electronically signed by:

## 2023-10-06 ENCOUNTER — HOSPITAL ENCOUNTER (OUTPATIENT)
Dept: CARDIOLOGY | Facility: HOSPITAL | Age: 73
Discharge: HOME | End: 2023-10-06
Payer: MEDICARE

## 2023-10-06 DIAGNOSIS — R94.31 ABNORMAL EKG: ICD-10-CM

## 2023-10-06 DIAGNOSIS — R60.0 LOWER EXTREMITY EDEMA: ICD-10-CM

## 2023-10-06 DIAGNOSIS — R06.02 SHORTNESS OF BREATH: ICD-10-CM

## 2023-10-06 LAB
BH CV REST NUCLEAR ISOTOPE DOSE: 10 MCI
BH CV STRESS COMMENTS STAGE 1: NORMAL
BH CV STRESS DOSE REGADENOSON STAGE 1: 0.4
BH CV STRESS DURATION MIN STAGE 1: 0
BH CV STRESS DURATION SEC STAGE 1: 10
BH CV STRESS NUCLEAR ISOTOPE DOSE: 30 MCI
BH CV STRESS PROTOCOL 1: NORMAL
BH CV STRESS RECOVERY BP: NORMAL MMHG
BH CV STRESS RECOVERY HR: 65 BPM
BH CV STRESS STAGE 1: 1
MAXIMAL PREDICTED HEART RATE: 147 BPM
PERCENT MAX PREDICTED HR: 46.94 %
STRESS BASELINE BP: NORMAL MMHG
STRESS BASELINE HR: 58 BPM
STRESS PERCENT HR: 55 %
STRESS POST PEAK BP: NORMAL MMHG
STRESS POST PEAK HR: 69 BPM
STRESS TARGET HR: 125 BPM

## 2023-10-06 PROCEDURE — 78452 HT MUSCLE IMAGE SPECT MULT: CPT

## 2023-10-06 PROCEDURE — 0 TECHNETIUM SESTAMIBI: Performed by: INTERNAL MEDICINE

## 2023-10-06 PROCEDURE — 25010000002 REGADENOSON 0.4 MG/5ML SOLUTION: Performed by: INTERNAL MEDICINE

## 2023-10-06 PROCEDURE — A9500 TC99M SESTAMIBI: HCPCS | Performed by: INTERNAL MEDICINE

## 2023-10-06 PROCEDURE — 93017 CV STRESS TEST TRACING ONLY: CPT

## 2023-10-06 RX ORDER — REGADENOSON 0.08 MG/ML
0.4 INJECTION, SOLUTION INTRAVENOUS
Status: COMPLETED | OUTPATIENT
Start: 2023-10-06 | End: 2023-10-06

## 2023-10-06 RX ADMIN — TECHNETIUM TC 99M SESTAMIBI 1 DOSE: 1 INJECTION INTRAVENOUS at 11:13

## 2023-10-06 RX ADMIN — TECHNETIUM TC 99M SESTAMIBI 1 DOSE: 1 INJECTION INTRAVENOUS at 09:56

## 2023-10-06 RX ADMIN — REGADENOSON 0.4 MG: 0.08 INJECTION, SOLUTION INTRAVENOUS at 11:12

## 2023-10-10 ENCOUNTER — TELEPHONE (OUTPATIENT)
Dept: CARDIOLOGY | Facility: CLINIC | Age: 73
End: 2023-10-10
Payer: MEDICARE

## 2023-10-10 NOTE — TELEPHONE ENCOUNTER
Tried to contact patient, no answer left VM to return call.    Patient aware will be called with jody time and date.    Minnie Holden MA        ----- Message from JIMY Moreno sent at 10/10/2023 10:11 AM EDT -----  Follow-up 1 week    Result Text  1.  Scintigraphy is suggestive of inferior wall ischemia with a mild increase in count densities on rest versus stress images.  However, this could be due, at least in part, to count averaging from some diaphragmatic uptake on rest images.  Polar map showed no statistically significant reversible defects.     2.  Moderate to severely depressed post-rest ejection fraction of 32% with global hypokinesis most pronounced in the inferobasilar segment, the inferolateral and lateral walls.     3.  No evidence of pharmacologically induced transient ischemic dilation or of increased lung uptake of radiopharmaceutical.

## 2023-10-17 ENCOUNTER — OFFICE VISIT (OUTPATIENT)
Dept: CARDIOLOGY | Facility: CLINIC | Age: 73
End: 2023-10-17
Payer: MEDICARE

## 2023-10-17 ENCOUNTER — LAB (OUTPATIENT)
Dept: CARDIOLOGY | Facility: CLINIC | Age: 73
End: 2023-10-17
Payer: MEDICARE

## 2023-10-17 VITALS
BODY MASS INDEX: 25.87 KG/M2 | WEIGHT: 191 LBS | HEIGHT: 72 IN | OXYGEN SATURATION: 100 % | DIASTOLIC BLOOD PRESSURE: 70 MMHG | HEART RATE: 77 BPM | SYSTOLIC BLOOD PRESSURE: 118 MMHG

## 2023-10-17 DIAGNOSIS — I10 ESSENTIAL HYPERTENSION: ICD-10-CM

## 2023-10-17 DIAGNOSIS — R06.00 DYSPNEA, UNSPECIFIED TYPE: ICD-10-CM

## 2023-10-17 DIAGNOSIS — I42.0 CARDIOMYOPATHY, DILATED: Primary | ICD-10-CM

## 2023-10-17 DIAGNOSIS — I42.0 CARDIOMYOPATHY, DILATED: ICD-10-CM

## 2023-10-17 DIAGNOSIS — I73.9 INTERMITTENT CLAUDICATION: ICD-10-CM

## 2023-10-17 DIAGNOSIS — R94.39 ABNORMAL STRESS TEST: ICD-10-CM

## 2023-10-17 LAB
ALBUMIN SERPL-MCNC: 3.4 G/DL (ref 3.5–5.2)
ALBUMIN/GLOB SERPL: 0.7 G/DL
ALP SERPL-CCNC: 100 U/L (ref 39–117)
ALT SERPL W P-5'-P-CCNC: 45 U/L (ref 1–41)
ANION GAP SERPL CALCULATED.3IONS-SCNC: 9.3 MMOL/L (ref 5–15)
AST SERPL-CCNC: 39 U/L (ref 1–40)
BASOPHILS # BLD AUTO: 0.05 10*3/MM3 (ref 0–0.2)
BASOPHILS NFR BLD AUTO: 1 % (ref 0–1.5)
BILIRUB SERPL-MCNC: 0.4 MG/DL (ref 0–1.2)
BUN SERPL-MCNC: 17 MG/DL (ref 8–23)
BUN/CREAT SERPL: 16.2 (ref 7–25)
CALCIUM SPEC-SCNC: 9.2 MG/DL (ref 8.6–10.5)
CHLORIDE SERPL-SCNC: 101 MMOL/L (ref 98–107)
CO2 SERPL-SCNC: 25.7 MMOL/L (ref 22–29)
CREAT SERPL-MCNC: 1.05 MG/DL (ref 0.76–1.27)
DEPRECATED RDW RBC AUTO: 51.8 FL (ref 37–54)
EGFRCR SERPLBLD CKD-EPI 2021: 75 ML/MIN/1.73
EOSINOPHIL # BLD AUTO: 0.07 10*3/MM3 (ref 0–0.4)
EOSINOPHIL NFR BLD AUTO: 1.4 % (ref 0.3–6.2)
ERYTHROCYTE [DISTWIDTH] IN BLOOD BY AUTOMATED COUNT: 14 % (ref 12.3–15.4)
GLOBULIN UR ELPH-MCNC: 5 GM/DL
GLUCOSE SERPL-MCNC: 98 MG/DL (ref 65–99)
HCT VFR BLD AUTO: 42.8 % (ref 37.5–51)
HGB BLD-MCNC: 14.1 G/DL (ref 13–17.7)
IMM GRANULOCYTES # BLD AUTO: 0.01 10*3/MM3 (ref 0–0.05)
IMM GRANULOCYTES NFR BLD AUTO: 0.2 % (ref 0–0.5)
LYMPHOCYTES # BLD AUTO: 1.18 10*3/MM3 (ref 0.7–3.1)
LYMPHOCYTES NFR BLD AUTO: 24.1 % (ref 19.6–45.3)
MCH RBC QN AUTO: 33.1 PG (ref 26.6–33)
MCHC RBC AUTO-ENTMCNC: 32.9 G/DL (ref 31.5–35.7)
MCV RBC AUTO: 100.5 FL (ref 79–97)
MONOCYTES # BLD AUTO: 0.71 10*3/MM3 (ref 0.1–0.9)
MONOCYTES NFR BLD AUTO: 14.5 % (ref 5–12)
NEUTROPHILS NFR BLD AUTO: 2.88 10*3/MM3 (ref 1.7–7)
NEUTROPHILS NFR BLD AUTO: 58.8 % (ref 42.7–76)
NRBC BLD AUTO-RTO: 0 /100 WBC (ref 0–0.2)
PLATELET # BLD AUTO: 228 10*3/MM3 (ref 140–450)
PMV BLD AUTO: 10.8 FL (ref 6–12)
POTASSIUM SERPL-SCNC: 3.8 MMOL/L (ref 3.5–5.2)
PROT SERPL-MCNC: 8.4 G/DL (ref 6–8.5)
RBC # BLD AUTO: 4.26 10*6/MM3 (ref 4.14–5.8)
SODIUM SERPL-SCNC: 136 MMOL/L (ref 136–145)
WBC NRBC COR # BLD: 4.9 10*3/MM3 (ref 3.4–10.8)

## 2023-10-17 PROCEDURE — 99214 OFFICE O/P EST MOD 30 MIN: CPT | Performed by: PHYSICIAN ASSISTANT

## 2023-10-17 PROCEDURE — 3074F SYST BP LT 130 MM HG: CPT | Performed by: PHYSICIAN ASSISTANT

## 2023-10-17 PROCEDURE — 3078F DIAST BP <80 MM HG: CPT | Performed by: PHYSICIAN ASSISTANT

## 2023-10-17 PROCEDURE — 85025 COMPLETE CBC W/AUTO DIFF WBC: CPT | Performed by: PHYSICIAN ASSISTANT

## 2023-10-17 PROCEDURE — 80053 COMPREHEN METABOLIC PANEL: CPT | Performed by: PHYSICIAN ASSISTANT

## 2023-10-17 RX ORDER — ASPIRIN 81 MG/1
81 TABLET ORAL DAILY
Qty: 30 TABLET | Refills: 5 | Status: SHIPPED | OUTPATIENT
Start: 2023-10-17

## 2023-10-17 NOTE — PROGRESS NOTES
Problem list     Subjective   Jevon Cotto is a 73 y.o. male     Chief Complaint   Patient presents with    ABNORMAL TESTING     Problem list  1.  Low risk stress test August 2021  1.1 stress test September 2023 with past medical inferior wall ischemia with severely depressed systolic function at 32% s   2.  Dilated cardiomyopathy  3.  Grade 2 diastolic dysfunction  4.  Lower extremity edema  5.  Hypertension  6.  Dyslipidemia    HPI    Patient is a 73 male who presents back to the office for follow-up.    Patient had symptoms last visit of significant exertional dyspnea.  He does not complain of any discomfort but significant dyspnea.  EKG was abnormal with some ST abnormalities.  This prompted cardiac testing to be scheduled.  He underwent stress testing with results as above.  Last visit, we stopped his diltiazem and transition to losartan with hydrochlorothiazide combination because of his edema.    He continues to be without chest pain.  He feels significant dyspnea when exerting he still has noticed this and it has been concerning for the patient.  He had significant lower extremity edema but that has improved with medication adjustment.  He does not describe PND or orthopnea.    He does not palpitate nor does he complain of dysrhythmic symptoms.  His main complaint appears to be his level of dyspnea.  He does not describe having any history of lung disease at all.  His functional status has declined to a degree.  He is stable otherwise.      Current Outpatient Medications on File Prior to Visit   Medication Sig Dispense Refill    atorvastatin (LIPITOR) 40 MG tablet Take 1 tablet by mouth Daily.      cholecalciferol (VITAMIN D3) 25 MCG (1000 UT) tablet Take 1 tablet by mouth Daily.      lamoTRIgine (LaMICtal) 150 MG tablet Take 1 tablet by mouth Daily.      losartan-hydrochlorothiazide (HYZAAR) 50-12.5 MG per tablet Take 1 tablet by mouth Daily. 30 tablet 5    multivitamin (THERAGRAN) tablet tablet Take  by  "mouth 3 (Three) Times a Day.      [DISCONTINUED] vitamin B-12 (CYANOCOBALAMIN) 1000 MCG tablet Take 1 tablet by mouth Daily.       No current facility-administered medications on file prior to visit.       Patient has no known allergies.    Past Medical History:   Diagnosis Date    Hyperlipidemia     Hypertension     Kidney stones     Seizures     Sleep apnea        Social History     Socioeconomic History    Marital status:    Tobacco Use    Smoking status: Never    Smokeless tobacco: Never   Vaping Use    Vaping Use: Never used   Substance and Sexual Activity    Alcohol use: Yes     Comment: occas socially    Drug use: Never    Sexual activity: Defer       Family History   Problem Relation Age of Onset    No Known Problems Mother     No Known Problems Father        Review of Systems   Constitutional: Negative.    HENT: Negative.     Eyes: Negative.    Respiratory:  Positive for shortness of breath. Negative for apnea, cough, chest tightness and wheezing.    Cardiovascular:  Negative for chest pain, palpitations and leg swelling.   Gastrointestinal:  Negative for blood in stool.   Endocrine: Negative.    Genitourinary: Negative.  Negative for hematuria.   Musculoskeletal: Negative.    Skin: Negative.  Negative for color change, rash and wound.   Allergic/Immunologic: Negative.    Neurological:  Negative for dizziness, syncope, weakness, light-headedness, numbness and headaches.   Hematological: Negative.  Does not bruise/bleed easily.   Psychiatric/Behavioral: Negative.  Negative for sleep disturbance.        Objective   Vitals:    10/17/23 0846   BP: 118/70   Pulse: 77   SpO2: 100%   Weight: 86.6 kg (191 lb)   Height: 182.9 cm (72\")      /70   Pulse 77   Ht 182.9 cm (72\")   Wt 86.6 kg (191 lb)   SpO2 100%   BMI 25.90 kg/m²     Lab Results (most recent)       None            Physical Exam  Vitals and nursing note reviewed.   Constitutional:       General: He is not in acute distress.     " Appearance: Normal appearance. He is well-developed.   HENT:      Head: Normocephalic and atraumatic.   Eyes:      General: No scleral icterus.        Right eye: No discharge.         Left eye: No discharge.      Conjunctiva/sclera: Conjunctivae normal.   Neck:      Vascular: No carotid bruit.   Cardiovascular:      Rate and Rhythm: Normal rate and regular rhythm.      Heart sounds: Normal heart sounds. No murmur heard.     No friction rub. No gallop.   Pulmonary:      Effort: Pulmonary effort is normal. No respiratory distress.      Breath sounds: Normal breath sounds. No wheezing or rales.   Chest:      Chest wall: No tenderness.   Musculoskeletal:      Right lower leg: No edema.      Left lower leg: No edema.   Skin:     General: Skin is warm and dry.      Coloration: Skin is not pale.      Findings: No erythema or rash.   Neurological:      Mental Status: He is alert and oriented to person, place, and time.      Cranial Nerves: No cranial nerve deficit.   Psychiatric:         Behavior: Behavior normal.         Procedure   Procedures       Assessment & Plan     Problems Addressed this Visit          Cardiac and Vasculature    Essential hypertension    Relevant Orders    [facility]    CBC & Differential    Comprehensive Metabolic Panel    Adult Transthoracic Echo Complete W/ Cont if Necessary Per Protocol    Duplex Lower Extremity Art / Grafts - Bilateral CAR    Cardiomyopathy, dilated - Primary    Relevant Orders    [facility]    CBC & Differential    Comprehensive Metabolic Panel    Adult Transthoracic Echo Complete W/ Cont if Necessary Per Protocol    Duplex Lower Extremity Art / Grafts - Bilateral CAR    Abnormal stress test    Relevant Orders    [facility]    CBC & Differential    Comprehensive Metabolic Panel    Adult Transthoracic Echo Complete W/ Cont if Necessary Per Protocol    Duplex Lower Extremity Art / Grafts - Bilateral CAR       Symptoms and Signs    Dyspnea     Relevant Orders    [facility] Cath    CBC & Differential    Comprehensive Metabolic Panel    Adult Transthoracic Echo Complete W/ Cont if Necessary Per Protocol    Duplex Lower Extremity Art / Grafts - Bilateral CAR     Other Visit Diagnoses       Intermittent claudication        Relevant Orders    Duplex Lower Extremity Art / Grafts - Bilateral CAR          Diagnoses         Codes Comments    Cardiomyopathy, dilated    -  Primary ICD-10-CM: I42.0  ICD-9-CM: 425.4     Dyspnea, unspecified type     ICD-10-CM: R06.00  ICD-9-CM: 786.09     Essential hypertension     ICD-10-CM: I10  ICD-9-CM: 401.9     Abnormal stress test     ICD-10-CM: R94.39  ICD-9-CM: 794.39     Intermittent claudication     ICD-10-CM: I73.9  ICD-9-CM: 443.9             Recommendation  1.  Patient is a 73-year-old male who presents to the office to be evaluated.  He has new onset cardiomyopathy at least by stress test with a questionable inferior wall ischemic defect in a patient with significant dyspnea in the absence of any lung disease.  Because of abnormality on testing with continued symptoms, we would like to evaluate coronary anatomy to see if ischemia is contributing factor of his cardiomyopathy as intervention could potentially improve his LV function.    2.  In the interim, his blood pressure is 118/70 mmHg.  I would like to add beta-blocker therapy but I would be hesitant to do that because of his pressure.  I am wanting to get an echocardiogram to confirm systolic dysfunction.  We can make further adjustments pending patient's response and findings on echocardiogram.    3.  Patient complains of significant leg discomfort.  He notices this when trying to walk or do activity and feels significant coldness to the lower extremities.  I would like to schedule arterial duplex to evaluate further.    4.  I want him to be on aspirin daily with his statin.  He has no chest pain at this time.  We will try to schedule catheterization and see  him back for follow-up to recommend further.  Follow-up with primary as scheduled.       Patient did not bring med list or medicine bottles to appointment, med list has been reviewed and updated based on patient's knowledge of their meds.      Advance Care Planning   ACP discussion was declined by the patient. Patient has an advance directive (not in EMR), copy requested.      Electronically signed by:

## 2023-10-17 NOTE — LETTER
October 17, 2023       No Recipients    Patient: Jevon Cotto   YOB: 1950   Date of Visit: 10/17/2023       Dear RADHA Roberts    Jevon Cotto was in my office today. Below is a copy of my note.    If you have questions, please do not hesitate to call me. I look forward to following Jevon along with you.         Sincerely,        JIMY Cali        CC:   No Recipients    Problem list     Subjective  Jevon Cotto is a 73 y.o. male     Chief Complaint   Patient presents with   • ABNORMAL TESTING     Problem list  1.  Low risk stress test August 2021  1.1 stress test September 2023 with past medical inferior wall ischemia with severely depressed systolic function at 32% s   2.  Dilated cardiomyopathy  3.  Grade 2 diastolic dysfunction  4.  Lower extremity edema  5.  Hypertension  6.  Dyslipidemia    HPI    Patient is a 73 male who presents back to the office for follow-up.    Patient had symptoms last visit of significant exertional dyspnea.  He does not complain of any discomfort but significant dyspnea.  EKG was abnormal with some ST abnormalities.  This prompted cardiac testing to be scheduled.  He underwent stress testing with results as above.  Last visit, we stopped his diltiazem and transition to losartan with hydrochlorothiazide combination because of his edema.    He continues to be without chest pain.  He feels significant dyspnea when exerting he still has noticed this and it has been concerning for the patient.  He had significant lower extremity edema but that has improved with medication adjustment.  He does not describe PND or orthopnea.    He does not palpitate nor does he complain of dysrhythmic symptoms.  His main complaint appears to be his level of dyspnea.  He does not describe having any history of lung disease at all.  His functional status has declined to a degree.  He is stable otherwise.      Current Outpatient Medications on File Prior to Visit   Medication Sig  Dispense Refill   • atorvastatin (LIPITOR) 40 MG tablet Take 1 tablet by mouth Daily.     • cholecalciferol (VITAMIN D3) 25 MCG (1000 UT) tablet Take 1 tablet by mouth Daily.     • lamoTRIgine (LaMICtal) 150 MG tablet Take 1 tablet by mouth Daily.     • losartan-hydrochlorothiazide (HYZAAR) 50-12.5 MG per tablet Take 1 tablet by mouth Daily. 30 tablet 5   • multivitamin (THERAGRAN) tablet tablet Take  by mouth 3 (Three) Times a Day.     • [DISCONTINUED] vitamin B-12 (CYANOCOBALAMIN) 1000 MCG tablet Take 1 tablet by mouth Daily.       No current facility-administered medications on file prior to visit.       Patient has no known allergies.    Past Medical History:   Diagnosis Date   • Hyperlipidemia    • Hypertension    • Kidney stones    • Seizures    • Sleep apnea        Social History     Socioeconomic History   • Marital status:    Tobacco Use   • Smoking status: Never   • Smokeless tobacco: Never   Vaping Use   • Vaping Use: Never used   Substance and Sexual Activity   • Alcohol use: Yes     Comment: occas socially   • Drug use: Never   • Sexual activity: Defer       Family History   Problem Relation Age of Onset   • No Known Problems Mother    • No Known Problems Father        Review of Systems   Constitutional: Negative.    HENT: Negative.     Eyes: Negative.    Respiratory:  Positive for shortness of breath. Negative for apnea, cough, chest tightness and wheezing.    Cardiovascular:  Negative for chest pain, palpitations and leg swelling.   Gastrointestinal:  Negative for blood in stool.   Endocrine: Negative.    Genitourinary: Negative.  Negative for hematuria.   Musculoskeletal: Negative.    Skin: Negative.  Negative for color change, rash and wound.   Allergic/Immunologic: Negative.    Neurological:  Negative for dizziness, syncope, weakness, light-headedness, numbness and headaches.   Hematological: Negative.  Does not bruise/bleed easily.   Psychiatric/Behavioral: Negative.  Negative for sleep  "disturbance.        Objective  Vitals:    10/17/23 0846   BP: 118/70   Pulse: 77   SpO2: 100%   Weight: 86.6 kg (191 lb)   Height: 182.9 cm (72\")      /70   Pulse 77   Ht 182.9 cm (72\")   Wt 86.6 kg (191 lb)   SpO2 100%   BMI 25.90 kg/m²     Lab Results (most recent)       None            Physical Exam  Vitals and nursing note reviewed.   Constitutional:       General: He is not in acute distress.     Appearance: Normal appearance. He is well-developed.   HENT:      Head: Normocephalic and atraumatic.   Eyes:      General: No scleral icterus.        Right eye: No discharge.         Left eye: No discharge.      Conjunctiva/sclera: Conjunctivae normal.   Neck:      Vascular: No carotid bruit.   Cardiovascular:      Rate and Rhythm: Normal rate and regular rhythm.      Heart sounds: Normal heart sounds. No murmur heard.     No friction rub. No gallop.   Pulmonary:      Effort: Pulmonary effort is normal. No respiratory distress.      Breath sounds: Normal breath sounds. No wheezing or rales.   Chest:      Chest wall: No tenderness.   Musculoskeletal:      Right lower leg: No edema.      Left lower leg: No edema.   Skin:     General: Skin is warm and dry.      Coloration: Skin is not pale.      Findings: No erythema or rash.   Neurological:      Mental Status: He is alert and oriented to person, place, and time.      Cranial Nerves: No cranial nerve deficit.   Psychiatric:         Behavior: Behavior normal.         Procedure  Procedures       Assessment & Plan    Problems Addressed this Visit          Cardiac and Vasculature    Essential hypertension    Relevant Orders    [facility]    CBC & Differential    Comprehensive Metabolic Panel    Adult Transthoracic Echo Complete W/ Cont if Necessary Per Protocol    Duplex Lower Extremity Art / Grafts - Bilateral CAR    Cardiomyopathy, dilated - Primary    Relevant Orders    [facility]    CBC & Differential    Comprehensive Metabolic Panel    " Adult Transthoracic Echo Complete W/ Cont if Necessary Per Protocol    Duplex Lower Extremity Art / Grafts - Bilateral CAR    Abnormal stress test    Relevant Orders    [facility]    CBC & Differential    Comprehensive Metabolic Panel    Adult Transthoracic Echo Complete W/ Cont if Necessary Per Protocol    Duplex Lower Extremity Art / Grafts - Bilateral CAR       Symptoms and Signs    Dyspnea    Relevant Orders    [facility]    CBC & Differential    Comprehensive Metabolic Panel    Adult Transthoracic Echo Complete W/ Cont if Necessary Per Protocol    Duplex Lower Extremity Art / Grafts - Bilateral CAR     Other Visit Diagnoses       Intermittent claudication        Relevant Orders    Duplex Lower Extremity Art / Grafts - Bilateral CAR          Diagnoses         Codes Comments    Cardiomyopathy, dilated    -  Primary ICD-10-CM: I42.0  ICD-9-CM: 425.4     Dyspnea, unspecified type     ICD-10-CM: R06.00  ICD-9-CM: 786.09     Essential hypertension     ICD-10-CM: I10  ICD-9-CM: 401.9     Abnormal stress test     ICD-10-CM: R94.39  ICD-9-CM: 794.39     Intermittent claudication     ICD-10-CM: I73.9  ICD-9-CM: 443.9             Recommendation  1.  Patient is a 73-year-old male who presents to the office to be evaluated.  He has new onset cardiomyopathy at least by stress test with a questionable inferior wall ischemic defect in a patient with significant dyspnea in the absence of any lung disease.  Because of abnormality on testing with continued symptoms, we would like to evaluate coronary anatomy to see if ischemia is contributing factor of his cardiomyopathy as intervention could potentially improve his LV function.    2.  In the interim, his blood pressure is 118/70 mmHg.  I would like to add beta-blocker therapy but I would be hesitant to do that because of his pressure.  I am wanting to get an echocardiogram to confirm systolic dysfunction.  We can make further adjustments pending patient's  response and findings on echocardiogram.    3.  Patient complains of significant leg discomfort.  He notices this when trying to walk or do activity and feels significant coldness to the lower extremities.  I would like to schedule arterial duplex to evaluate further.    4.  I want him to be on aspirin daily with his statin.  He has no chest pain at this time.  We will try to schedule catheterization and see him back for follow-up to recommend further.  Follow-up with primary as scheduled.       Patient did not bring med list or medicine bottles to appointment, med list has been reviewed and updated based on patient's knowledge of their meds.      Advance Care Planning  ACP discussion was declined by the patient. Patient has an advance directive (not in EMR), copy requested.      Electronically signed by:

## 2023-10-24 ENCOUNTER — HOSPITAL ENCOUNTER (OUTPATIENT)
Dept: CARDIOLOGY | Facility: HOSPITAL | Age: 73
Discharge: HOME | End: 2023-10-24
Payer: MEDICARE

## 2023-10-24 DIAGNOSIS — I42.0 CARDIOMYOPATHY, DILATED: ICD-10-CM

## 2023-10-24 DIAGNOSIS — I10 ESSENTIAL HYPERTENSION: ICD-10-CM

## 2023-10-24 DIAGNOSIS — R06.00 DYSPNEA, UNSPECIFIED TYPE: ICD-10-CM

## 2023-10-24 DIAGNOSIS — R94.39 ABNORMAL STRESS TEST: ICD-10-CM

## 2023-10-24 LAB
BH CV ECHO MEAS - ACS: 1.59 CM
BH CV ECHO MEAS - AO MAX PG: 9.6 MMHG
BH CV ECHO MEAS - AO MEAN PG: 2.8 MMHG
BH CV ECHO MEAS - AO ROOT DIAM: 3.8 CM
BH CV ECHO MEAS - AO V2 MAX: 154.6 CM/SEC
BH CV ECHO MEAS - AO V2 VTI: 24.1 CM
BH CV ECHO MEAS - AVA(I,D): 1.93 CM2
BH CV ECHO MEAS - EDV(CUBED): 89.8 ML
BH CV ECHO MEAS - EDV(MOD-SP4): 117 ML
BH CV ECHO MEAS - EF(MOD-SP4): 34.4 %
BH CV ECHO MEAS - EF_3D-VOL: 32 %
BH CV ECHO MEAS - ESV(CUBED): 53.9 ML
BH CV ECHO MEAS - ESV(MOD-SP4): 76.7 ML
BH CV ECHO MEAS - FS: 15.6 %
BH CV ECHO MEAS - IVS/LVPW: 1 CM
BH CV ECHO MEAS - IVSD: 1.88 CM
BH CV ECHO MEAS - LA A2CS (ATRIAL LENGTH): 6 CM
BH CV ECHO MEAS - LA A4C LENGTH: 5.8 CM
BH CV ECHO MEAS - LA DIMENSION: 5.3 CM
BH CV ECHO MEAS - LAT PEAK E' VEL: 6.5 CM/SEC
BH CV ECHO MEAS - LV DIASTOLIC VOL/BSA (35-75): 56 CM2
BH CV ECHO MEAS - LV MASS(C)D: 391 GRAMS
BH CV ECHO MEAS - LV MAX PG: 1.99 MMHG
BH CV ECHO MEAS - LV MEAN PG: 1 MMHG
BH CV ECHO MEAS - LV SYSTOLIC VOL/BSA (12-30): 36.7 CM2
BH CV ECHO MEAS - LV V1 MAX: 70.5 CM/SEC
BH CV ECHO MEAS - LV V1 VTI: 11.4 CM
BH CV ECHO MEAS - LVIDD: 4.5 CM
BH CV ECHO MEAS - LVIDS: 3.8 CM
BH CV ECHO MEAS - LVOT AREA: 4.1 CM2
BH CV ECHO MEAS - LVOT DIAM: 2.28 CM
BH CV ECHO MEAS - LVPWD: 1.88 CM
BH CV ECHO MEAS - MED PEAK E' VEL: 3.6 CM/SEC
BH CV ECHO MEAS - MR MAX PG: 76.2 MMHG
BH CV ECHO MEAS - MR MAX VEL: 436.6 CM/SEC
BH CV ECHO MEAS - MV A MAX VEL: 62.7 CM/SEC
BH CV ECHO MEAS - MV DEC SLOPE: 556.5 CM/SEC2
BH CV ECHO MEAS - MV DEC TIME: 0.17 SEC
BH CV ECHO MEAS - MV E MAX VEL: 126 CM/SEC
BH CV ECHO MEAS - MV E/A: 2.01
BH CV ECHO MEAS - MV MAX PG: 6.9 MMHG
BH CV ECHO MEAS - MV MEAN PG: 2.39 MMHG
BH CV ECHO MEAS - MV P1/2T: 63.7 MSEC
BH CV ECHO MEAS - MV V2 VTI: 29 CM
BH CV ECHO MEAS - MVA(P1/2T): 3.5 CM2
BH CV ECHO MEAS - MVA(VTI): 1.6 CM2
BH CV ECHO MEAS - PA V2 MAX: 77.3 CM/SEC
BH CV ECHO MEAS - RAP SYSTOLE: 10 MMHG
BH CV ECHO MEAS - RV MAX PG: 0.67 MMHG
BH CV ECHO MEAS - RV V1 MAX: 40.9 CM/SEC
BH CV ECHO MEAS - RV V1 VTI: 10.7 CM
BH CV ECHO MEAS - RVDD: 3.8 CM
BH CV ECHO MEAS - RVSP: 58.4 MMHG
BH CV ECHO MEAS - SI(MOD-SP4): 19.3 ML/M2
BH CV ECHO MEAS - SV(LVOT): 46.4 ML
BH CV ECHO MEAS - SV(MOD-SP4): 40.3 ML
BH CV ECHO MEAS - TAPSE (>1.6): 1.61 CM
BH CV ECHO MEAS - TR MAX PG: 48.4 MMHG
BH CV ECHO MEAS - TR MAX VEL: 347.8 CM/SEC
BH CV ECHO MEASUREMENTS AVERAGE E/E' RATIO: 24.95
LEFT ATRIUM VOLUME INDEX: 29.8 ML/M2
LEFT ATRIUM VOLUME: 62 ML

## 2023-10-24 PROCEDURE — 93306 TTE W/DOPPLER COMPLETE: CPT

## 2023-10-31 ENCOUNTER — TELEPHONE (OUTPATIENT)
Dept: CARDIOLOGY | Facility: CLINIC | Age: 73
End: 2023-10-31
Payer: MEDICARE

## 2023-10-31 DIAGNOSIS — I42.0 CARDIOMYOPATHY, DILATED: Primary | ICD-10-CM

## 2023-10-31 DIAGNOSIS — Q21.0 VSD (VENTRICULAR SEPTAL DEFECT): ICD-10-CM

## 2023-10-31 NOTE — TELEPHONE ENCOUNTER
Patient aware of recommendations, states would like to move forward with testing.Aware will be called with jody time and date.    Minnie Holden MA        ----- Message from JIMY Moreno sent at 10/31/2023  1:06 PM EDT -----  Patient is scheduled for catheterization.  Can you schedule the patient for cardiac MRI?.  Let him know, that on his echocardiogram, there is some images concerning for a VSD or a ventricular septal defect.  It is an abnormal opening in the septum.  Echo was not conclusive but a cardiac MRI could visualize that.  Can you order it with diagnosis VSD, dilated cardiomyopathy and chronic systolic heart failure    Result Text       Left ventricular ejection fraction appears to be 31 - 35%.    Left ventricular wall thickness is consistent with mild to moderate concentric hypertrophy.    Left ventricular diastolic function is consistent with (grade III w/high LAP) reversible restrictive pattern.    The left atrial cavity is moderate to severely dilated.    Left atrial volume is severely increased.    The right atrial cavity is borderline dilated.    Moderate to severe tricuspid valve regurgitation is present.    Estimated right ventricular systolic pressure from tricuspid regurgitation is markedly elevated (>55 mmHg).    Mild dilation of the aortic root is present.     Technically visually estimated ejection fraction is 35 to 40%.  Adequate study.     1.  LV size is normal global LV systolic function is mildly to moderately depressed.  3D ejection fraction is 32%.  Mild concentric left ventricular hypertrophy with no focal wall motion abnormalities.  Grade 2-3 diastolic dysfunction.  Moderate biatrial enlargement.  RV size and function are grossly preserved.  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  This is best seen in parasternal long axis views.  However, no transseptal flow was demonstrated on limited color Doppler sampling.  No intracavitary mass or thrombus.  The mitral valve is  morphologically normal with no mitral stenosis and with trivial to mild MR.  The aortic valve is not well-visualized.  There is sclerosis of the right greater than the noncoronary cusps.  Hemodynamic parameters include a peak instantaneous gradient of 10, mean gradient of 3 and aortic valve area of 1.92 cm².  There is trivial AI.  There is trivial to mild TR from a morphologically normal valve.     3.  No pericardial or great vessel pathology.     4.  RV and PA systolic pressures are estimated in the mid 50s.

## 2023-11-08 ENCOUNTER — OUTSIDE FACILITY SERVICE (OUTPATIENT)
Dept: CARDIOLOGY | Facility: CLINIC | Age: 73
End: 2023-11-08
Payer: MEDICARE

## 2023-11-08 PROCEDURE — 93458 L HRT ARTERY/VENTRICLE ANGIO: CPT | Performed by: INTERNAL MEDICINE

## 2023-11-09 ENCOUNTER — TELEPHONE (OUTPATIENT)
Dept: CARDIOLOGY | Facility: CLINIC | Age: 73
End: 2023-11-09
Payer: MEDICARE

## 2023-11-09 NOTE — TELEPHONE ENCOUNTER
Emails sent for cath images to be powershared with [facility] Urban and request for them to be assigned to Dr. Crisostomo to review.

## 2023-11-10 ENCOUNTER — HOSPITAL ENCOUNTER (OUTPATIENT)
Dept: CARDIOLOGY | Facility: HOSPITAL | Age: 73
Discharge: HOME | End: 2023-11-10

## 2023-11-10 ENCOUNTER — OFFICE VISIT (OUTPATIENT)
Dept: CARDIOLOGY | Facility: CLINIC | Age: 73
End: 2023-11-10
Payer: MEDICARE

## 2023-11-10 VITALS
SYSTOLIC BLOOD PRESSURE: 132 MMHG | BODY MASS INDEX: 26.03 KG/M2 | HEART RATE: 74 BPM | OXYGEN SATURATION: 98 % | DIASTOLIC BLOOD PRESSURE: 72 MMHG | WEIGHT: 192.2 LBS | HEIGHT: 72 IN

## 2023-11-10 DIAGNOSIS — Q21.0 VSD (VENTRICULAR SEPTAL DEFECT): ICD-10-CM

## 2023-11-10 DIAGNOSIS — E78.5 DYSLIPIDEMIA: ICD-10-CM

## 2023-11-10 DIAGNOSIS — I10 ESSENTIAL HYPERTENSION: ICD-10-CM

## 2023-11-10 DIAGNOSIS — Z00.6 ENCOUNTER FOR EXAMINATION FOR NORMAL COMPARISON AND CONTROL IN CLINICAL RESEARCH PROGRAM: ICD-10-CM

## 2023-11-10 DIAGNOSIS — I25.10 CORONARY ARTERY DISEASE INVOLVING NATIVE CORONARY ARTERY OF NATIVE HEART WITHOUT ANGINA PECTORIS: Primary | ICD-10-CM

## 2023-11-10 DIAGNOSIS — I42.0 CARDIOMYOPATHY, DILATED: ICD-10-CM

## 2023-11-10 RX ORDER — SACUBITRIL AND VALSARTAN 24; 26 MG/1; MG/1
1 TABLET, FILM COATED ORAL 2 TIMES DAILY
Qty: 28 TABLET | Refills: 0 | COMMUNITY
Start: 2023-11-10

## 2023-11-10 RX ORDER — SACUBITRIL AND VALSARTAN 24; 26 MG/1; MG/1
1 TABLET, FILM COATED ORAL 2 TIMES DAILY
Qty: 180 TABLET | Refills: 3 | Status: SHIPPED | OUTPATIENT
Start: 2023-11-10

## 2023-11-10 RX ORDER — METOPROLOL SUCCINATE 25 MG/1
25 TABLET, EXTENDED RELEASE ORAL DAILY
Qty: 90 TABLET | Refills: 3 | Status: SHIPPED | OUTPATIENT
Start: 2023-11-10

## 2023-11-10 NOTE — PROGRESS NOTES
Subjective     Jevon Cotto is a 73 y.o. male who presents today for Follow-up (CATH f/u).    CHIEF COMPLIANT  Chief Complaint   Patient presents with    Follow-up     CATH f/u       Active Problems:  Ischemic Cardiomyopathy, EF 30-35% per cath 11/8/23  Echo 10/24/23: EF 32%, Grade 2-3 diastolic dysfunction, echo imaging suggestive of muscular VSD, RV and PA systolic pressure estimated in the mid 50s, moderate to severe TR mild to moderate MR  CAD: LHC 11/8/2023: Complex distal left main disease, high-grade ostial LAD  Hypertension    HPI  The patient is a 73 year old male that returns to the office to follow up after left cardiac catheterization on 11/8/23.  He is here to discuss cath findings and recent echocardiogram results.  This is my first encounter with the patient, he typically follows with Alphonso Carranza PA-C.    LHC showed complex distal left main disease and high grade ostial LAD disease, EF 30-35%.  Echocardiogram showed EF 32%, VSD, PA systolic pressure 55mmHg, moderate to severe TR.    The patient denies chest pain, significant SOA, syncope, near syncope, palpitations, orthopnea or PND.  HR and BP stable.   Cath site healing as expected.      PRIOR MEDS  Current Outpatient Medications on File Prior to Visit   Medication Sig Dispense Refill    aspirin 81 MG EC tablet Take 1 tablet by mouth Daily. 30 tablet 5    atorvastatin (LIPITOR) 40 MG tablet Take 1 tablet by mouth Daily.      cholecalciferol (VITAMIN D3) 25 MCG (1000 UT) tablet Take 1 tablet by mouth Daily.      lamoTRIgine (LaMICtal) 150 MG tablet Take 1 tablet by mouth Daily.      multivitamin (THERAGRAN) tablet tablet Take  by mouth Daily.      [DISCONTINUED] losartan-hydrochlorothiazide (HYZAAR) 50-12.5 MG per tablet Take 1 tablet by mouth Daily. 30 tablet 5     No current facility-administered medications on file prior to visit.       ALLERGIES  Patient has no known allergies.    HISTORY  Past Medical History:   Diagnosis Date     "Hyperlipidemia     Hypertension     Kidney stones     Seizures     Sleep apnea        Social History     Socioeconomic History    Marital status:    Tobacco Use    Smoking status: Never    Smokeless tobacco: Never   Vaping Use    Vaping Use: Never used   Substance and Sexual Activity    Alcohol use: Yes     Comment: occas socially    Drug use: Never    Sexual activity: Defer       Family History   Problem Relation Age of Onset    No Known Problems Mother     No Known Problems Father        Review of Systems   Constitutional:  Positive for fatigue. Negative for chills, diaphoresis and fever.   HENT: Negative.     Eyes: Negative.  Negative for visual disturbance (glasses).   Respiratory:  Positive for apnea (uses cpap) and shortness of breath (occas. when working). Negative for cough and chest tightness.    Cardiovascular:  Positive for leg swelling (LT leg worse than RT leg). Negative for chest pain and palpitations.   Gastrointestinal: Negative.  Negative for blood in stool.   Endocrine: Negative.  Negative for cold intolerance and heat intolerance.   Genitourinary: Negative.  Negative for hematuria.   Musculoskeletal:  Negative for arthralgias, back pain, myalgias, neck pain and neck stiffness.   Skin: Negative.  Negative for color change, rash and wound.   Allergic/Immunologic: Negative.  Negative for environmental allergies and food allergies.   Neurological:  Positive for dizziness, light-headedness (when walking, working or walking up a hill) and headaches (occas.). Negative for syncope, weakness and numbness.   Hematological:  Bruises/bleeds easily.   Psychiatric/Behavioral:  Positive for sleep disturbance (can't sleep at night due  to legs and feet being cold).        Objective     VITALS: /72 (BP Location: Right arm, Patient Position: Sitting)   Pulse 74   Ht 182.9 cm (72.01\")   Wt 87.2 kg (192 lb 3.2 oz)   SpO2 98%   BMI 26.06 kg/m²     LABS:   Lab Results (most recent)       None      "       IMAGING:   No Images in the past 120 days found..    EXAM:  Physical Exam  Constitutional:       Appearance: Normal appearance.   Eyes:      Pupils: Pupils are equal, round, and reactive to light.   Cardiovascular:      Rate and Rhythm: Normal rate and regular rhythm.      Pulses:           Carotid pulses are 2+ on the right side and 2+ on the left side.       Radial pulses are 2+ on the right side and 2+ on the left side.        Dorsalis pedis pulses are 2+ on the right side and 2+ on the left side.        Posterior tibial pulses are 2+ on the right side and 2+ on the left side.      Heart sounds: Murmur (2/6 systolic murmur) heard.   Pulmonary:      Effort: Pulmonary effort is normal.      Breath sounds: Normal breath sounds.   Abdominal:      General: Bowel sounds are normal.      Palpations: Abdomen is soft.   Musculoskeletal:      Right lower leg: No edema.      Left lower leg: No edema.   Skin:     General: Skin is warm and dry.      Capillary Refill: Capillary refill takes less than 2 seconds.   Neurological:      General: No focal deficit present.      Mental Status: He is alert and oriented to person, place, and time.   Psychiatric:         Mood and Affect: Mood normal.         Thought Content: Thought content normal.         Procedure   Procedures       Assessment & Plan    Diagnosis Plan   1. Coronary artery disease involving native coronary artery of native heart without angina pectoris  Ambulatory Referral to Cardiothoracic Surgery      2. VSD (ventricular septal defect)  Ambulatory Referral to Cardiothoracic Surgery      3. Cardiomyopathy, dilated        4. Essential hypertension        5. Dyslipidemia          Plan:  CAD: The patient has distal LM and high grade ostial LAD disease and will need surgical revascularization.  Continue aspirin and lipitor.  We will make urgent referral to Dr Lu.  Will send in [facility] for the patient to have if needed.  Have reviewed instructions on appropriate  use.  The patient was advised to limit exertional activity until further evaluated by CT surgery.  He was advised to promptly call 911 if he develops chest pain symptoms.    VSD: Will make urgent referral to Dr Lu for further evaluation and management of VSD.    Dilated Cardiomyopathy: d/c losartan.  Start Entresto 24mg/26mg PO BID, Toprol 25mg PO daily, Jardiance 10mg daily.  Life Vest ordered.  Will plan to repeat an echo 90 days post revascularization to re-evaluate LV function.  If EF remains 35% or less will discuss possible AICD placement.    Essential HTN: d/c losartan.  Start Entresto and Toprol as discussed above.  The patient was instructed to monitor BP at home and to notify our office if systolic BP is consistently greater than 130-135 systolic.  Goal BP is 130-135/70-80.  Dyslipidemia: Continue statin.      Return in about 3 months (around 2/10/2024).    Patient did not bring med list or medicine bottles to appointment, med list has been reviewed and updated based on patient's knowledge of their meds.     Advance Care Planning   ACP discussion was held with the patient during this visit. Patient has an advance directive (not in EMR), copy requested.            MEDS ORDERED DURING VISIT:  New Medications Ordered This Visit   Medications    metoprolol succinate XL (TOPROL-XL) 25 MG 24 hr tablet     Sig: Take 1 tablet by mouth Daily.     Dispense:  90 tablet     Refill:  3    empagliflozin (Jardiance) 10 MG tablet tablet     Sig: Take 1 tablet by mouth Daily.     Dispense:  90 tablet     Refill:  3    sacubitril-valsartan (Entresto) 24-26 MG tablet     Sig: Take 1 tablet by mouth 2 (Two) Times a Day.     Dispense:  180 tablet     Refill:  3    sacubitril-valsartan (Entresto) 24-26 MG tablet     Sig: Take 1 tablet by mouth 2 (Two) Times a Day.     Dispense:  28 tablet     Refill:  0     Order Specific Question:   Lot Number?     Answer:   [id number]     Order Specific Question:   Expiration Date?      Answer:   4/26/2023     Order Specific Question:   Quantity     Answer:   28    empagliflozin (Jardiance) 10 MG tablet tablet     Sig: Take 1 tablet by mouth Daily.     Dispense:  30 tablet     Refill:  0       DISCONTINUED MEDS DURING VISIT:   Medications Discontinued During This Encounter   Medication Reason    losartan-hydrochlorothiazide (HYZAAR) 50-12.5 MG per tablet     empagliflozin (JARDIANCE) 10 MG tablet tablet Other- See Medication Note          This document has been electronically signed by RADHA Lopez  November 10, 2023 13:53 EST    Dictated Utilizing Dragon Dictation: Part of this note may be an electronic transcription/translation of spoken language to printed text using the Dragon Dictation System

## 2023-12-04 ENCOUNTER — TRANSCRIBE ORDERS (OUTPATIENT)
Dept: ADMINISTRATIVE | Facility: HOSPITAL | Age: 73
End: 2023-12-04
Payer: MEDICARE

## 2023-12-04 DIAGNOSIS — I07.1 RHEUMATIC TRICUSPID VALVE REGURGITATION: ICD-10-CM

## 2023-12-04 DIAGNOSIS — I25.85 CHRONIC CORONARY MICROVASCULAR DYSFUNCTION: Primary | ICD-10-CM

## 2023-12-04 DIAGNOSIS — I25.85 CHRONIC CORONARY MICROVASCULAR DYSFUNCTION: ICD-10-CM

## 2023-12-20 ENCOUNTER — HOSPITAL ENCOUNTER (OUTPATIENT)
Dept: RESPIRATORY THERAPY | Facility: HOSPITAL | Age: 73
Discharge: HOME | End: 2023-12-20
Admitting: THORACIC SURGERY (CARDIOTHORACIC VASCULAR SURGERY)
Payer: MEDICARE

## 2023-12-20 ENCOUNTER — TRANSCRIBE ORDERS (OUTPATIENT)
Dept: ADMINISTRATIVE | Facility: HOSPITAL | Age: 73
End: 2023-12-20
Payer: MEDICARE

## 2023-12-20 VITALS — HEART RATE: 50 BPM | RESPIRATION RATE: 18 BRPM | OXYGEN SATURATION: 99 %

## 2023-12-20 DIAGNOSIS — I25.85 CHRONIC CORONARY MICROVASCULAR DYSFUNCTION: Primary | ICD-10-CM

## 2023-12-20 DIAGNOSIS — I25.85 CHRONIC CORONARY MICROVASCULAR DYSFUNCTION: ICD-10-CM

## 2023-12-20 PROCEDURE — 94760 N-INVAS EAR/PLS OXIMETRY 1: CPT

## 2023-12-20 PROCEDURE — 94726 PLETHYSMOGRAPHY LUNG VOLUMES: CPT

## 2023-12-20 PROCEDURE — 94060 EVALUATION OF WHEEZING: CPT

## 2023-12-20 PROCEDURE — 94640 AIRWAY INHALATION TREATMENT: CPT

## 2023-12-20 PROCEDURE — 94729 DIFFUSING CAPACITY: CPT

## 2023-12-20 PROCEDURE — 94799 UNLISTED PULMONARY SVC/PX: CPT

## 2023-12-20 RX ORDER — ALBUTEROL SULFATE 2.5 MG/3ML
2.5 SOLUTION RESPIRATORY (INHALATION) ONCE
Status: COMPLETED | OUTPATIENT
Start: 2023-12-20 | End: 2023-12-20

## 2023-12-20 RX ADMIN — ALBUTEROL SULFATE 2.5 MG: 2.5 SOLUTION RESPIRATORY (INHALATION) at 13:53

## 2024-02-12 ENCOUNTER — OFFICE VISIT (OUTPATIENT)
Dept: CARDIOLOGY | Facility: CLINIC | Age: 74
End: 2024-02-12
Payer: MEDICARE

## 2024-02-12 VITALS
WEIGHT: 184 LBS | SYSTOLIC BLOOD PRESSURE: 118 MMHG | DIASTOLIC BLOOD PRESSURE: 70 MMHG | HEART RATE: 77 BPM | HEIGHT: 72 IN | OXYGEN SATURATION: 97 % | BODY MASS INDEX: 24.92 KG/M2

## 2024-02-12 DIAGNOSIS — Q21.0 VSD (VENTRICULAR SEPTAL DEFECT): ICD-10-CM

## 2024-02-12 DIAGNOSIS — R07.9 CHEST PAIN, UNSPECIFIED TYPE: ICD-10-CM

## 2024-02-12 DIAGNOSIS — I25.10 CORONARY ARTERY DISEASE INVOLVING NATIVE CORONARY ARTERY, UNSPECIFIED WHETHER ANGINA PRESENT, UNSPECIFIED WHETHER NATIVE OR TRANSPLANTED HEART: Primary | ICD-10-CM

## 2024-02-12 DIAGNOSIS — R06.02 SHORTNESS OF BREATH: ICD-10-CM

## 2024-02-12 DIAGNOSIS — I10 ESSENTIAL HYPERTENSION: ICD-10-CM

## 2024-02-12 PROCEDURE — 99214 OFFICE O/P EST MOD 30 MIN: CPT | Performed by: PHYSICIAN ASSISTANT

## 2024-02-12 PROCEDURE — 3074F SYST BP LT 130 MM HG: CPT | Performed by: PHYSICIAN ASSISTANT

## 2024-02-12 PROCEDURE — 3078F DIAST BP <80 MM HG: CPT | Performed by: PHYSICIAN ASSISTANT

## 2024-02-12 RX ORDER — SACUBITRIL AND VALSARTAN 24; 26 MG/1; MG/1
1 TABLET, FILM COATED ORAL 2 TIMES DAILY
Qty: 180 TABLET | Refills: 3 | Status: SHIPPED | OUTPATIENT
Start: 2024-02-12

## 2024-02-12 RX ORDER — EZETIMIBE 10 MG/1
10 TABLET ORAL DAILY
Qty: 30 TABLET | Refills: 11 | Status: SHIPPED | OUTPATIENT
Start: 2024-02-12

## 2024-02-12 RX ORDER — LOSARTAN POTASSIUM 25 MG/1
25 TABLET ORAL DAILY
COMMUNITY
End: 2024-02-12

## 2024-05-13 ENCOUNTER — OFFICE VISIT (OUTPATIENT)
Dept: CARDIOLOGY | Facility: CLINIC | Age: 74
End: 2024-05-13
Payer: MEDICARE

## 2024-05-13 VITALS
OXYGEN SATURATION: 96 % | SYSTOLIC BLOOD PRESSURE: 121 MMHG | DIASTOLIC BLOOD PRESSURE: 72 MMHG | HEART RATE: 84 BPM | BODY MASS INDEX: 25.47 KG/M2 | HEIGHT: 72 IN | WEIGHT: 188 LBS

## 2024-05-13 DIAGNOSIS — I42.0 CARDIOMYOPATHY, DILATED: ICD-10-CM

## 2024-05-13 DIAGNOSIS — I25.10 CORONARY ARTERY DISEASE INVOLVING NATIVE CORONARY ARTERY, UNSPECIFIED WHETHER ANGINA PRESENT, UNSPECIFIED WHETHER NATIVE OR TRANSPLANTED HEART: Primary | ICD-10-CM

## 2024-05-13 DIAGNOSIS — I50.22 CHRONIC SYSTOLIC HEART FAILURE: ICD-10-CM

## 2024-05-13 PROCEDURE — 3074F SYST BP LT 130 MM HG: CPT | Performed by: PHYSICIAN ASSISTANT

## 2024-05-13 PROCEDURE — 99214 OFFICE O/P EST MOD 30 MIN: CPT | Performed by: PHYSICIAN ASSISTANT

## 2024-05-13 PROCEDURE — 3078F DIAST BP <80 MM HG: CPT | Performed by: PHYSICIAN ASSISTANT

## 2024-05-13 RX ORDER — POTASSIUM CHLORIDE 20 MEQ/1
20 TABLET, EXTENDED RELEASE ORAL DAILY
COMMUNITY
Start: 2024-05-01 | End: 2025-05-01

## 2024-05-13 RX ORDER — FUROSEMIDE 40 MG/1
40 TABLET ORAL DAILY
COMMUNITY
Start: 2024-04-12 | End: 2025-05-01

## 2024-05-13 NOTE — PROGRESS NOTES
Problem list     Subjective   Jevon Cotto is a 74 y.o. male     Chief Complaint   Patient presents with    Follow-up     CABG     Problem list  1.  Coronary artery disease  1.1 cardiac catheterization November 2023 due to cardiomyopathy demonstrated complex distal left main disease and high-grade ostial LAD disease.  No obstructive disease otherwise but recommendations for coronary artery bypass grafting.  1.2 patient being evaluated at [facility].  1.3 coronary artery bypass grafting April 2023 with LIMA to LAD, vein graft to ramus, and vein graft to diagonal  2.  Questionable VSD  2.1 MRI not showing any evidence of VSD although limited.  2.2 echocardiogram October 2023 suggestive of a muscular VSD  3.  Dilated cardiomyopathy  3.1 EF estimated at 30 to 35%  3.2 EF at 41% by cardiac MRI December 2023  3.3 EF estimated at 50% according to surgeon operative note at the end of bypass grafting  4.  Valvular heart disease  4.1 mild TR and mild to moderate MR by cardiac MRI  5.  Hypertension  6.  Dyslipidemia  7.  Seizure disorder     HPI    Patient is a 74-year-old male presenting back for follow-up.  He is doing remarkable.  He feels clinical improvement.  His dyspnea is improved.  He has chest wall pain from surgery but is feeling much better.  He has no complaints of PND or orthopnea.    He has lower extremity edema bilaterally.  He does not complain of palpitations or dysrhythmic symptoms.  He feels remarkably improved.      Current Outpatient Medications on File Prior to Visit   Medication Sig Dispense Refill    aspirin 81 MG EC tablet Take 1 tablet by mouth Daily. 30 tablet 5    atorvastatin (LIPITOR) 40 MG tablet Take 1 tablet by mouth Daily.      cholecalciferol (VITAMIN D3) 25 MCG (1000 UT) tablet Take 1 tablet by mouth Daily.      ezetimibe (ZETIA) 10 MG tablet Take 1 tablet by mouth Daily. 30 tablet 11    furosemide (LASIX) 40 MG tablet Take 1 tablet by mouth Daily.      lamoTRIgine (LaMICtal)  150 MG tablet Take 1 tablet by mouth Daily.      metoprolol succinate XL (TOPROL-XL) 25 MG 24 hr tablet Take 1 tablet by mouth Daily. 90 tablet 3    multivitamin (THERAGRAN) tablet tablet Take  by mouth Daily.      potassium chloride (KLOR-CON M20) 20 MEQ CR tablet Take 1 tablet by mouth Daily.      [DISCONTINUED] sacubitril-valsartan (Entresto) 24-26 MG tablet Take 1 tablet by mouth 2 (Two) Times a Day. 180 tablet 3     No current facility-administered medications on file prior to visit.       Other    Past Medical History:   Diagnosis Date    Hyperlipidemia     Hypertension     Kidney stones     Seizures     Sleep apnea        Social History     Socioeconomic History    Marital status:    Tobacco Use    Smoking status: Never    Smokeless tobacco: Never   Vaping Use    Vaping status: Never Used   Substance and Sexual Activity    Alcohol use: Yes     Comment: occas socially    Drug use: Never    Sexual activity: Defer       Family History   Problem Relation Age of Onset    No Known Problems Mother     No Known Problems Father        Review of Systems   Constitutional:  Negative for activity change, appetite change, chills, diaphoresis, fatigue and fever.   HENT: Negative.  Negative for congestion, sinus pressure and sinus pain.    Eyes: Negative.  Negative for visual disturbance.   Respiratory:  Positive for apnea and shortness of breath. Negative for cough, chest tightness and wheezing.    Cardiovascular: Negative.  Negative for chest pain, palpitations and leg swelling.   Gastrointestinal: Negative.  Negative for blood in stool.   Endocrine: Negative.  Negative for cold intolerance and heat intolerance.   Genitourinary: Negative.  Negative for hematuria.   Musculoskeletal: Negative.  Negative for gait problem.   Skin: Negative.  Negative for color change, rash and wound.   Allergic/Immunologic: Negative.  Negative for environmental allergies and food allergies.   Neurological: Negative.  Negative for  "dizziness, syncope, weakness, light-headedness, numbness and headaches.   Hematological:  Bruises/bleeds easily.   Psychiatric/Behavioral:  Positive for sleep disturbance.        Objective   Vitals:    05/13/24 1122   BP: 121/72   BP Location: Left arm   Patient Position: Sitting   Cuff Size: Adult   Pulse: 84   SpO2: 96%   Weight: 85.3 kg (188 lb)   Height: 182.9 cm (72\")      /72 (BP Location: Left arm, Patient Position: Sitting, Cuff Size: Adult)   Pulse 84   Ht 182.9 cm (72\")   Wt 85.3 kg (188 lb)   SpO2 96%   BMI 25.50 kg/m²     Lab Results (most recent)       None            Physical Exam  Vitals and nursing note reviewed.   Constitutional:       General: He is not in acute distress.     Appearance: Normal appearance. He is well-developed.   HENT:      Head: Normocephalic and atraumatic.   Eyes:      General: No scleral icterus.        Right eye: No discharge.         Left eye: No discharge.      Conjunctiva/sclera: Conjunctivae normal.   Neck:      Vascular: No carotid bruit.   Cardiovascular:      Rate and Rhythm: Normal rate and regular rhythm.      Heart sounds: Normal heart sounds. No murmur heard.     No friction rub. No gallop.   Pulmonary:      Effort: Pulmonary effort is normal. No respiratory distress.      Breath sounds: Normal breath sounds. No wheezing or rales.   Chest:      Chest wall: No tenderness.   Musculoskeletal:      Right lower leg: Edema present.      Left lower leg: Edema present.   Skin:     General: Skin is warm and dry.      Coloration: Skin is not pale.      Findings: No erythema or rash.   Neurological:      Mental Status: He is alert and oriented to person, place, and time.      Cranial Nerves: No cranial nerve deficit.   Psychiatric:         Behavior: Behavior normal.         Procedure   Procedures       Assessment & Plan     Problems Addressed this Visit          Cardiac and Vasculature    Cardiomyopathy, dilated    Relevant Medications    sacubitril-valsartan " (ENTRESTO) 24-26 MG tablet    Other Relevant Orders    Adult Transthoracic Echo Complete W/ Cont if Necessary Per Protocol    Coronary artery disease involving native coronary artery - Primary    Relevant Medications    sacubitril-valsartan (ENTRESTO) 24-26 MG tablet    Other Relevant Orders    Adult Transthoracic Echo Complete W/ Cont if Necessary Per Protocol     Other Visit Diagnoses       Chronic systolic heart failure        Relevant Medications    sacubitril-valsartan (ENTRESTO) 24-26 MG tablet    Other Relevant Orders    Adult Transthoracic Echo Complete W/ Cont if Necessary Per Protocol          Diagnoses         Codes Comments    Coronary artery disease involving native coronary artery, unspecified whether angina present, unspecified whether native or transplanted heart    -  Primary ICD-10-CM: I25.10  ICD-9-CM: 414.01     Cardiomyopathy, dilated     ICD-10-CM: I42.0  ICD-9-CM: 425.4     Chronic systolic heart failure     ICD-10-CM: I50.22  ICD-9-CM: 428.22           Recommendation  1.  Patient is a 74-year-old male presenting for evaluation.  Patient is status post bypass grafting feeling well without angina.  For now, I would recommend continued antiplatelet and statin therapy.  He has labs performed by primary.  I recommended LDL goal less than 70.    2.  Patient with dilated cardiomyopathy and chronic systolic heart failure.  Has lower extremity edema.  According to the operative note, his EF.  It reports 50% at the end of the procedure.  However, PEDRO report suggest an EF in the 30s.  He would benefit from guideline directed medical therapy and we will put him back on Entresto.    3.  Otherwise, he is stable.  I am scheduling repeat echocardiogram to assess LV function and further monitor patient's overall cardiac status.    4.,  He feels clinically improved we will continue to monitor and see him back for follow-up after echocardiogram.  Follow-up with primary as scheduled.       Patient did not bring  med list or medicine bottles to appointment, med list has been reviewed and updated based on patient's knowledge of their meds.    Advance Care Planning   ACP discussion was declined by the patient. Patient has an advance directive (not in EMR), copy requested.        Electronically signed by:

## 2024-05-13 NOTE — LETTER
May 13, 2024       No Recipients    Patient: Jevon Cotto   YOB: 1950   Date of Visit: 5/13/2024       Dear RADHA Roberts    Jevon Cotto was in my office today. Below is a copy of my note.    If you have questions, please do not hesitate to call me. I look forward to following Jevon along with you.         Sincerely,        JIMY Cali        CC:   No Recipients    Problem list     Subjective  Jevon Cotto is a 74 y.o. male     Chief Complaint   Patient presents with   • Follow-up     CABG     Problem list  1.  Coronary artery disease  1.1 cardiac catheterization November 2023 due to cardiomyopathy demonstrated complex distal left main disease and high-grade ostial LAD disease.  No obstructive disease otherwise but recommendations for coronary artery bypass grafting.  1.2 patient being evaluated at [facility].  1.3 coronary artery bypass grafting April 2023 with LIMA to LAD, vein graft to ramus, and vein graft to diagonal  2.  Questionable VSD  2.1 MRI not showing any evidence of VSD although limited.  2.2 echocardiogram October 2023 suggestive of a muscular VSD  3.  Dilated cardiomyopathy  3.1 EF estimated at 30 to 35%  3.2 EF at 41% by cardiac MRI December 2023  3.3 EF estimated at 50% according to surgeon operative note at the end of bypass grafting  4.  Valvular heart disease  4.1 mild TR and mild to moderate MR by cardiac MRI  5.  Hypertension  6.  Dyslipidemia  7.  Seizure disorder     HPI    Patient is a 74-year-old male presenting back for follow-up.  He is doing remarkable.  He feels clinical improvement.  His dyspnea is improved.  He has chest wall pain from surgery but is feeling much better.  He has no complaints of PND or orthopnea.    He has lower extremity edema bilaterally.  He does not complain of palpitations or dysrhythmic symptoms.  He feels remarkably improved.      Current Outpatient Medications on File Prior to Visit   Medication Sig Dispense Refill    • aspirin 81 MG EC tablet Take 1 tablet by mouth Daily. 30 tablet 5   • atorvastatin (LIPITOR) 40 MG tablet Take 1 tablet by mouth Daily.     • cholecalciferol (VITAMIN D3) 25 MCG (1000 UT) tablet Take 1 tablet by mouth Daily.     • ezetimibe (ZETIA) 10 MG tablet Take 1 tablet by mouth Daily. 30 tablet 11   • furosemide (LASIX) 40 MG tablet Take 1 tablet by mouth Daily.     • lamoTRIgine (LaMICtal) 150 MG tablet Take 1 tablet by mouth Daily.     • metoprolol succinate XL (TOPROL-XL) 25 MG 24 hr tablet Take 1 tablet by mouth Daily. 90 tablet 3   • multivitamin (THERAGRAN) tablet tablet Take  by mouth Daily.     • potassium chloride (KLOR-CON M20) 20 MEQ CR tablet Take 1 tablet by mouth Daily.     • [DISCONTINUED] sacubitril-valsartan (Entresto) 24-26 MG tablet Take 1 tablet by mouth 2 (Two) Times a Day. 180 tablet 3     No current facility-administered medications on file prior to visit.       Other    Past Medical History:   Diagnosis Date   • Hyperlipidemia    • Hypertension    • Kidney stones    • Seizures    • Sleep apnea        Social History     Socioeconomic History   • Marital status:    Tobacco Use   • Smoking status: Never   • Smokeless tobacco: Never   Vaping Use   • Vaping status: Never Used   Substance and Sexual Activity   • Alcohol use: Yes     Comment: occas socially   • Drug use: Never   • Sexual activity: Defer       Family History   Problem Relation Age of Onset   • No Known Problems Mother    • No Known Problems Father        Review of Systems   Constitutional:  Negative for activity change, appetite change, chills, diaphoresis, fatigue and fever.   HENT: Negative.  Negative for congestion, sinus pressure and sinus pain.    Eyes: Negative.  Negative for visual disturbance.   Respiratory:  Positive for apnea and shortness of breath. Negative for cough, chest tightness and wheezing.    Cardiovascular: Negative.  Negative for chest pain, palpitations and leg swelling.   Gastrointestinal:  "Negative.  Negative for blood in stool.   Endocrine: Negative.  Negative for cold intolerance and heat intolerance.   Genitourinary: Negative.  Negative for hematuria.   Musculoskeletal: Negative.  Negative for gait problem.   Skin: Negative.  Negative for color change, rash and wound.   Allergic/Immunologic: Negative.  Negative for environmental allergies and food allergies.   Neurological: Negative.  Negative for dizziness, syncope, weakness, light-headedness, numbness and headaches.   Hematological:  Bruises/bleeds easily.   Psychiatric/Behavioral:  Positive for sleep disturbance.        Objective  Vitals:    05/13/24 1122   BP: 121/72   BP Location: Left arm   Patient Position: Sitting   Cuff Size: Adult   Pulse: 84   SpO2: 96%   Weight: 85.3 kg (188 lb)   Height: 182.9 cm (72\")      /72 (BP Location: Left arm, Patient Position: Sitting, Cuff Size: Adult)   Pulse 84   Ht 182.9 cm (72\")   Wt 85.3 kg (188 lb)   SpO2 96%   BMI 25.50 kg/m²     Lab Results (most recent)       None            Physical Exam  Vitals and nursing note reviewed.   Constitutional:       General: He is not in acute distress.     Appearance: Normal appearance. He is well-developed.   HENT:      Head: Normocephalic and atraumatic.   Eyes:      General: No scleral icterus.        Right eye: No discharge.         Left eye: No discharge.      Conjunctiva/sclera: Conjunctivae normal.   Neck:      Vascular: No carotid bruit.   Cardiovascular:      Rate and Rhythm: Normal rate and regular rhythm.      Heart sounds: Normal heart sounds. No murmur heard.     No friction rub. No gallop.   Pulmonary:      Effort: Pulmonary effort is normal. No respiratory distress.      Breath sounds: Normal breath sounds. No wheezing or rales.   Chest:      Chest wall: No tenderness.   Musculoskeletal:      Right lower leg: Edema present.      Left lower leg: Edema present.   Skin:     General: Skin is warm and dry.      Coloration: Skin is not pale.      " Findings: No erythema or rash.   Neurological:      Mental Status: He is alert and oriented to person, place, and time.      Cranial Nerves: No cranial nerve deficit.   Psychiatric:         Behavior: Behavior normal.         Procedure  Procedures       Assessment & Plan    Problems Addressed this Visit          Cardiac and Vasculature    Cardiomyopathy, dilated    Relevant Medications    sacubitril-valsartan (ENTRESTO) 24-26 MG tablet    Other Relevant Orders    Adult Transthoracic Echo Complete W/ Cont if Necessary Per Protocol    Coronary artery disease involving native coronary artery - Primary    Relevant Medications    sacubitril-valsartan (ENTRESTO) 24-26 MG tablet    Other Relevant Orders    Adult Transthoracic Echo Complete W/ Cont if Necessary Per Protocol     Other Visit Diagnoses       Chronic systolic heart failure        Relevant Medications    sacubitril-valsartan (ENTRESTO) 24-26 MG tablet    Other Relevant Orders    Adult Transthoracic Echo Complete W/ Cont if Necessary Per Protocol          Diagnoses         Codes Comments    Coronary artery disease involving native coronary artery, unspecified whether angina present, unspecified whether native or transplanted heart    -  Primary ICD-10-CM: I25.10  ICD-9-CM: 414.01     Cardiomyopathy, dilated     ICD-10-CM: I42.0  ICD-9-CM: 425.4     Chronic systolic heart failure     ICD-10-CM: I50.22  ICD-9-CM: 428.22           Recommendation  1.  Patient is a 74-year-old male presenting for evaluation.  Patient is status post bypass grafting feeling well without angina.  For now, I would recommend continued antiplatelet and statin therapy.  He has labs performed by primary.  I recommended LDL goal less than 70.    2.  Patient with dilated cardiomyopathy and chronic systolic heart failure.  Has lower extremity edema.  According to the operative note, his EF.  It reports 50% at the end of the procedure.  However, PEDRO report suggest an EF in the 30s.  He would  benefit from guideline directed medical therapy and we will put him back on Entresto.    3.  Otherwise, he is stable.  I am scheduling repeat echocardiogram to assess LV function and further monitor patient's overall cardiac status.    4.,  He feels clinically improved we will continue to monitor and see him back for follow-up after echocardiogram.  Follow-up with primary as scheduled.       Patient did not bring med list or medicine bottles to appointment, med list has been reviewed and updated based on patient's knowledge of their meds.    Advance Care Planning  ACP discussion was declined by the patient. Patient has an advance directive (not in EMR), copy requested.        Electronically signed by:

## 2024-05-20 RX ORDER — LOSARTAN POTASSIUM AND HYDROCHLOROTHIAZIDE 12.5; 5 MG/1; MG/1
1 TABLET ORAL DAILY
Qty: 30 TABLET | Refills: 5 | OUTPATIENT
Start: 2024-05-20

## 2024-06-12 ENCOUNTER — HOSPITAL ENCOUNTER (OUTPATIENT)
Dept: CARDIOLOGY | Facility: HOSPITAL | Age: 74
Discharge: HOME | End: 2024-06-12
Payer: MEDICARE

## 2024-06-12 DIAGNOSIS — I50.22 CHRONIC SYSTOLIC HEART FAILURE: ICD-10-CM

## 2024-06-12 DIAGNOSIS — I25.10 CORONARY ARTERY DISEASE INVOLVING NATIVE CORONARY ARTERY, UNSPECIFIED WHETHER ANGINA PRESENT, UNSPECIFIED WHETHER NATIVE OR TRANSPLANTED HEART: ICD-10-CM

## 2024-06-12 DIAGNOSIS — I42.0 CARDIOMYOPATHY, DILATED: ICD-10-CM

## 2024-06-12 LAB
BH CV ECHO MEAS - ACS: 2.03 CM
BH CV ECHO MEAS - AO MAX PG: 6.2 MMHG
BH CV ECHO MEAS - AO MEAN PG: 4.3 MMHG
BH CV ECHO MEAS - AO ROOT DIAM: 3.4 CM
BH CV ECHO MEAS - AO V2 MAX: 124.9 CM/SEC
BH CV ECHO MEAS - AO V2 VTI: 30.3 CM
BH CV ECHO MEAS - EDV(CUBED): 141.4 ML
BH CV ECHO MEAS - EDV(MOD-SP4): 152 ML
BH CV ECHO MEAS - EF(MOD-SP4): 43.9 %
BH CV ECHO MEAS - EF_3D-VOL: 36 %
BH CV ECHO MEAS - ESV(CUBED): 69.4 ML
BH CV ECHO MEAS - ESV(MOD-SP4): 85.2 ML
BH CV ECHO MEAS - FS: 21.1 %
BH CV ECHO MEAS - IVS/LVPW: 1.02 CM
BH CV ECHO MEAS - IVSD: 1.43 CM
BH CV ECHO MEAS - LA DIMENSION: 5.2 CM
BH CV ECHO MEAS - LAT PEAK E' VEL: 7.2 CM/SEC
BH CV ECHO MEAS - LV DIASTOLIC VOL/BSA (35-75): 73.2 CM2
BH CV ECHO MEAS - LV MASS(C)D: 315.3 GRAMS
BH CV ECHO MEAS - LV SYSTOLIC VOL/BSA (12-30): 41.1 CM2
BH CV ECHO MEAS - LVIDD: 5.2 CM
BH CV ECHO MEAS - LVIDS: 4.1 CM
BH CV ECHO MEAS - LVPWD: 1.4 CM
BH CV ECHO MEAS - MED PEAK E' VEL: 4.2 CM/SEC
BH CV ECHO MEAS - MV A MAX VEL: 68.1 CM/SEC
BH CV ECHO MEAS - MV DEC TIME: 0.33 SEC
BH CV ECHO MEAS - MV E MAX VEL: 78.4 CM/SEC
BH CV ECHO MEAS - MV E/A: 1.15
BH CV ECHO MEAS - RVDD: 3.2 CM
BH CV ECHO MEAS - SV(MOD-SP4): 66.8 ML
BH CV ECHO MEAS - SVI(MOD-SP4): 32.2 ML/M2
BH CV ECHO MEASUREMENTS AVERAGE E/E' RATIO: 13.75
LEFT ATRIUM VOLUME INDEX: 37.7 ML/M2

## 2024-06-12 PROCEDURE — 93306 TTE W/DOPPLER COMPLETE: CPT

## 2024-07-01 ENCOUNTER — TELEPHONE (OUTPATIENT)
Dept: CARDIOLOGY | Facility: CLINIC | Age: 74
End: 2024-07-01
Payer: MEDICARE

## 2024-07-01 NOTE — TELEPHONE ENCOUNTER
RELAY      NO ANSWER, LVM TO RETURN CALL----- Message from Alphonso Carranza sent at 7/1/2024  1:12 PM EDT -----  Address upon follow-up in 2 to 3 months.  Patient can discontinue LifeVest

## 2024-07-02 ENCOUNTER — TELEPHONE (OUTPATIENT)
Dept: CARDIOLOGY | Facility: CLINIC | Age: 74
End: 2024-07-02
Payer: MEDICARE

## 2024-07-02 NOTE — TELEPHONE ENCOUNTER
Called pt, he stated he never worse a lifevest. I stated the vest was ordered 11/2023, he stated he doesn't know anything about it. I apologized for the confusion and stated I would look into the issue.     Explained that Alphonso would go into results in depth at his follow up and that thankfully, a vest is not needed at this time.

## 2024-07-02 NOTE — TELEPHONE ENCOUNTER
Called and spoke with Kayla Roberts who reports the pt was filled for a LIfeVest on 11/11/23 and on 11/22/23 pt opted to d/c his life vest per their records.

## 2024-07-02 NOTE — TELEPHONE ENCOUNTER
Name: Jevon Cotto      Relationship: Self      Best Callback Number: [phone]      HUB PROVIDED THE RELAY MESSAGE FROM THE OFFICE      PATIENT: HAS FURTHER QUESTIONS AND WOULD LIKE A CALL BACK AT THE FOLLOWING PHONE NUMBER [phone]    ADDITIONAL INFORMATION: PATIENT IS NOT SURE WHAT LIFE VEST IS.

## 2024-09-18 ENCOUNTER — TELEPHONE (OUTPATIENT)
Dept: CARDIOLOGY | Facility: CLINIC | Age: 74
End: 2024-09-18

## 2024-09-18 ENCOUNTER — OFFICE VISIT (OUTPATIENT)
Dept: CARDIOLOGY | Facility: CLINIC | Age: 74
End: 2024-09-18
Payer: MEDICARE

## 2024-09-18 VITALS
SYSTOLIC BLOOD PRESSURE: 117 MMHG | HEIGHT: 72 IN | HEART RATE: 66 BPM | BODY MASS INDEX: 25.6 KG/M2 | WEIGHT: 189 LBS | DIASTOLIC BLOOD PRESSURE: 68 MMHG | OXYGEN SATURATION: 97 %

## 2024-09-18 DIAGNOSIS — I42.0 CARDIOMYOPATHY, DILATED: ICD-10-CM

## 2024-09-18 DIAGNOSIS — I25.10 CORONARY ARTERY DISEASE INVOLVING NATIVE CORONARY ARTERY, UNSPECIFIED WHETHER ANGINA PRESENT, UNSPECIFIED WHETHER NATIVE OR TRANSPLANTED HEART: ICD-10-CM

## 2024-09-18 DIAGNOSIS — I50.22 CHRONIC SYSTOLIC CONGESTIVE HEART FAILURE: Primary | ICD-10-CM

## 2024-09-18 PROCEDURE — 3074F SYST BP LT 130 MM HG: CPT | Performed by: PHYSICIAN ASSISTANT

## 2024-09-18 PROCEDURE — 3078F DIAST BP <80 MM HG: CPT | Performed by: PHYSICIAN ASSISTANT

## 2024-09-18 PROCEDURE — 99214 OFFICE O/P EST MOD 30 MIN: CPT | Performed by: PHYSICIAN ASSISTANT

## 2024-09-18 RX ORDER — DAPAGLIFLOZIN 10 MG/1
1 TABLET, FILM COATED ORAL DAILY
Qty: 30 TABLET | Refills: 11 | Status: SHIPPED | OUTPATIENT
Start: 2024-09-18

## 2024-09-18 RX ORDER — DAPAGLIFLOZIN 10 MG/1
1 TABLET, FILM COATED ORAL DAILY
Qty: 30 TABLET | Refills: 11 | Status: SHIPPED | OUTPATIENT
Start: 2024-09-18 | End: 2024-09-18 | Stop reason: SDUPTHER

## 2024-09-24 RX ORDER — METOPROLOL SUCCINATE 25 MG/1
25 TABLET, EXTENDED RELEASE ORAL DAILY
Qty: 90 TABLET | Refills: 3 | Status: SHIPPED | OUTPATIENT
Start: 2024-09-24

## 2024-12-18 ENCOUNTER — OFFICE VISIT (OUTPATIENT)
Dept: CARDIOLOGY | Facility: CLINIC | Age: 74
End: 2024-12-18
Payer: MEDICARE

## 2024-12-18 VITALS
SYSTOLIC BLOOD PRESSURE: 128 MMHG | HEART RATE: 64 BPM | WEIGHT: 185 LBS | BODY MASS INDEX: 25.09 KG/M2 | OXYGEN SATURATION: 96 % | DIASTOLIC BLOOD PRESSURE: 67 MMHG

## 2024-12-18 DIAGNOSIS — I25.10 CORONARY ARTERY DISEASE INVOLVING NATIVE CORONARY ARTERY, UNSPECIFIED WHETHER ANGINA PRESENT, UNSPECIFIED WHETHER NATIVE OR TRANSPLANTED HEART: ICD-10-CM

## 2024-12-18 DIAGNOSIS — I50.22 CHRONIC SYSTOLIC CONGESTIVE HEART FAILURE: Primary | ICD-10-CM

## 2024-12-18 DIAGNOSIS — I42.0 CARDIOMYOPATHY, DILATED: ICD-10-CM

## 2024-12-18 PROCEDURE — 3078F DIAST BP <80 MM HG: CPT | Performed by: PHYSICIAN ASSISTANT

## 2024-12-18 PROCEDURE — 99213 OFFICE O/P EST LOW 20 MIN: CPT | Performed by: PHYSICIAN ASSISTANT

## 2024-12-18 PROCEDURE — 3074F SYST BP LT 130 MM HG: CPT | Performed by: PHYSICIAN ASSISTANT

## 2024-12-18 RX ORDER — FUROSEMIDE 20 MG/1
20 TABLET ORAL DAILY PRN
Qty: 90 TABLET | Refills: 3 | Status: SHIPPED | OUTPATIENT
Start: 2024-12-18

## 2024-12-18 RX ORDER — LAMOTRIGINE 200 MG/1
200 TABLET ORAL DAILY
COMMUNITY

## 2024-12-18 NOTE — PROGRESS NOTES
Problem list     Subjective   Jevon Cotto is a 74 y.o. male     Chief Complaint   Patient presents with    3 month follow up     CAD     Problem list  1.  Coronary artery disease  1.1 cardiac catheterization November 2023 due to cardiomyopathy demonstrated complex distal left main disease and high-grade ostial LAD disease.  No obstructive disease otherwise but recommendations for coronary artery bypass grafting.  1.2 patient being evaluated at [facility].  1.3 coronary artery bypass grafting April 2024 with LIMA to LAD, vein graft to ramus, and vein graft to diagonal  2.  Questionable VSD  2.1 echocardiogram October 2023 suggestive of a muscular VSD  2.2 follow-up cardiac MRI December 2024 with no definitive VSD noted  2.3 repeat transthoracic echocardiogram June 2024 with no evidence of VSD noted  3.  Dilated cardiomyopathy  3.1 EF estimated at 30 to 35%  3.2 EF at 41% by cardiac MRI December 2023  3.3 EF estimated at 50% according to surgeon operative note at the end of bypass grafting  3.4 EF estimated at 40± 5 percent by echocardiogram June 2024  4.  Valvular heart disease  4.1 mild TR and mild to moderate MR by cardiac MRI  5.  Chronic systolic and diastolic heart failure  6.  Dyslipidemia  7.  Seizure disorder  8.  Hypertension  9.  Pleural effusion  9.1 VATS procedure performed through [facility] in 2024        HPI    Patient is a 74-year-old male presenting back to the office for assessment.  Clinically, he does remarkably well.  He does not experience any chest pain or pressure.  Dyspnea is mild to moderate.  He still undergoing rehabilitation or therapy.  Patient had recent bypass grafting and has systolic and diastolic heart failure.    He does not describe PND or orthopnea.  No complaints of any edema.  He does not describe palpitating nor does complain of dysrhythmic symptoms.  He is doing well at this time.    Current Outpatient Medications on File Prior to Visit   Medication  Sig Dispense Refill    aspirin 81 MG EC tablet Take 1 tablet by mouth Daily. 30 tablet 5    atorvastatin (LIPITOR) 40 MG tablet Take 1 tablet by mouth Daily.      lamoTRIgine (LaMICtal) 150 MG tablet Take 1 tablet by mouth Daily.      lamoTRIgine (LaMICtal) 200 MG tablet Take 1 tablet by mouth Daily.      metoprolol succinate XL (TOPROL-XL) 25 MG 24 hr tablet TAKE 1 TABLET EVERY DAY 90 tablet 3    multivitamin (THERAGRAN) tablet tablet Take  by mouth Daily.      sacubitril-valsartan (ENTRESTO) 24-26 MG tablet Take 1 tablet by mouth 2 (Two) Times a Day. 180 tablet 3    [DISCONTINUED] ezetimibe (ZETIA) 10 MG tablet Take 1 tablet by mouth Daily. 30 tablet 11    [DISCONTINUED] Farxiga 10 MG tablet Take 10 mg by mouth Daily. 30 tablet 11     No current facility-administered medications on file prior to visit.       Other    Past Medical History:   Diagnosis Date    Hyperlipidemia     Hypertension     Kidney stones     Seizures     Sleep apnea        Social History     Socioeconomic History    Marital status:    Tobacco Use    Smoking status: Never    Smokeless tobacco: Never   Vaping Use    Vaping status: Never Used   Substance and Sexual Activity    Alcohol use: Yes     Comment: occas socially    Drug use: Never    Sexual activity: Defer       Family History   Problem Relation Age of Onset    No Known Problems Mother     No Known Problems Father        Review of Systems   Constitutional: Negative.  Negative for activity change, appetite change, chills, fatigue and fever.   HENT: Negative.  Negative for congestion, sinus pressure and sinus pain.    Eyes: Negative.  Negative for visual disturbance.   Respiratory:  Positive for apnea and shortness of breath. Negative for cough, chest tightness and wheezing.    Cardiovascular: Negative.  Negative for chest pain, palpitations and leg swelling.   Gastrointestinal: Negative.  Negative for blood in stool.   Endocrine: Negative.  Negative for cold intolerance and heat  intolerance.   Genitourinary: Negative.  Negative for hematuria.   Musculoskeletal: Negative.  Negative for gait problem.   Skin: Negative.  Negative for color change, rash and wound.   Allergic/Immunologic: Negative.  Negative for environmental allergies and food allergies.   Neurological:  Negative for dizziness, syncope, weakness, light-headedness, numbness and headaches.   Hematological:  Bruises/bleeds easily.   Psychiatric/Behavioral: Negative.  Negative for sleep disturbance.        Objective   Vitals:    12/18/24 1133   BP: 128/67   BP Location: Right arm   Patient Position: Sitting   Cuff Size: Adult   Pulse: 64   SpO2: 96%   Weight: 83.9 kg (185 lb)      /67 (BP Location: Right arm, Patient Position: Sitting, Cuff Size: Adult)   Pulse 64   Wt 83.9 kg (185 lb)   SpO2 96%   BMI 25.09 kg/m²     Lab Results (most recent)       None            Physical Exam  Vitals and nursing note reviewed.   Constitutional:       General: He is not in acute distress.     Appearance: Normal appearance. He is well-developed.   HENT:      Head: Normocephalic and atraumatic.   Eyes:      General: No scleral icterus.        Right eye: No discharge.         Left eye: No discharge.      Conjunctiva/sclera: Conjunctivae normal.   Neck:      Vascular: No carotid bruit.   Cardiovascular:      Rate and Rhythm: Normal rate and regular rhythm.      Heart sounds: Normal heart sounds. No murmur heard.     No friction rub. No gallop.   Pulmonary:      Effort: Pulmonary effort is normal. No respiratory distress.      Breath sounds: Normal breath sounds. No wheezing or rales.   Chest:      Chest wall: No tenderness.   Musculoskeletal:      Right lower leg: No edema.      Left lower leg: No edema.   Skin:     General: Skin is warm and dry.      Coloration: Skin is not pale.      Findings: No erythema or rash.   Neurological:      Mental Status: He is alert and oriented to person, place, and time.      Cranial Nerves: No cranial nerve  deficit.   Psychiatric:         Behavior: Behavior normal.         Procedure   Procedures       Assessment & Plan     Problems Addressed this Visit          Cardiac and Vasculature    Cardiomyopathy, dilated    Coronary artery disease involving native coronary artery     Other Visit Diagnoses       Chronic systolic congestive heart failure    -  Primary          Diagnoses         Codes Comments    Chronic systolic congestive heart failure    -  Primary ICD-10-CM: I50.22  ICD-9-CM: 428.22, 428.0     Cardiomyopathy, dilated     ICD-10-CM: I42.0  ICD-9-CM: 425.4     Coronary artery disease involving native coronary artery, unspecified whether angina present, unspecified whether native or transplanted heart     ICD-10-CM: I25.10  ICD-9-CM: 414.01             Recommendations  1.  Patient is a 74-year-old male presenting to the office for evaluation post bypass.  He is doing remarkably well.  He does not describe any angina.  He does have persistent mild to moderate levels of dyspnea.  He appears euvolemic on exam.  For now, we can monitor.    2.  Patient with dilated cardiomyopathy on beta-blocker as well as Entresto.  We are limited in regards to titrating therapy because of baseline hypotension.  Baseline EF is at 40±5% by recent echo.  We can continue to monitor.    3.  Patient is euvolemic on examination today.  We will monitor and continue medical therapy.    4.  We will see him back for follow-up in 6 months.  Patient has labs performed by primary.  We recommend an LDL goal less than 70.  Follow-up with primary as scheduled.  Follow-up in our office in 6 months or sooner for symptoms as discussed.       Patient brought in medicine list to appointment, it's been reviewed with patient and med list was updated in the chart.      Advance Care Planning   ACP discussion was declined by the patient. Patient has an advance directive (not in EMR), copy requested.        Electronically signed by:

## 2024-12-18 NOTE — LETTER
December 18, 2024     Jeremy Krueger Jr., MD  [address] [place] [id number]    Patient: Jevon Cotto   YOB: 1950   Date of Visit: 12/18/2024       Dear Jeremy Krueger Jr., MD    Jevon Cotto was in my office today. Below is a copy of my note.    If you have questions, please do not hesitate to call me. I look forward to following Jevon along with you.         Sincerely,        JIMY Cali        CC: No Recipients    Problem list     Subjective  Jevon Cotto is a 74 y.o. male     Chief Complaint   Patient presents with   • 3 month follow up     CAD     Problem list  1.  Coronary artery disease  1.1 cardiac catheterization November 2023 due to cardiomyopathy demonstrated complex distal left main disease and high-grade ostial LAD disease.  No obstructive disease otherwise but recommendations for coronary artery bypass grafting.  1.2 patient being evaluated at [facility].  1.3 coronary artery bypass grafting April 2024 with LIMA to LAD, vein graft to ramus, and vein graft to diagonal  2.  Questionable VSD  2.1 echocardiogram October 2023 suggestive of a muscular VSD  2.2 follow-up cardiac MRI December 2024 with no definitive VSD noted  2.3 repeat transthoracic echocardiogram June 2024 with no evidence of VSD noted  3.  Dilated cardiomyopathy  3.1 EF estimated at 30 to 35%  3.2 EF at 41% by cardiac MRI December 2023  3.3 EF estimated at 50% according to surgeon operative note at the end of bypass grafting  3.4 EF estimated at 40± 5 percent by echocardiogram June 2024  4.  Valvular heart disease  4.1 mild TR and mild to moderate MR by cardiac MRI  5.  Chronic systolic and diastolic heart failure  6.  Dyslipidemia  7.  Seizure disorder  8.  Hypertension  9.  Pleural effusion  9.1 VATS procedure performed through [facility] in 2024        HPI    Patient is a 74-year-old male presenting back to the office for assessment.  Clinically, he does remarkably  well.  He does not experience any chest pain or pressure.  Dyspnea is mild to moderate.  He still undergoing rehabilitation or therapy.  Patient had recent bypass grafting and has systolic and diastolic heart failure.    He does not describe PND or orthopnea.  No complaints of any edema.  He does not describe palpitating nor does complain of dysrhythmic symptoms.  He is doing well at this time.    Current Outpatient Medications on File Prior to Visit   Medication Sig Dispense Refill   • aspirin 81 MG EC tablet Take 1 tablet by mouth Daily. 30 tablet 5   • atorvastatin (LIPITOR) 40 MG tablet Take 1 tablet by mouth Daily.     • lamoTRIgine (LaMICtal) 150 MG tablet Take 1 tablet by mouth Daily.     • lamoTRIgine (LaMICtal) 200 MG tablet Take 1 tablet by mouth Daily.     • metoprolol succinate XL (TOPROL-XL) 25 MG 24 hr tablet TAKE 1 TABLET EVERY DAY 90 tablet 3   • multivitamin (THERAGRAN) tablet tablet Take  by mouth Daily.     • sacubitril-valsartan (ENTRESTO) 24-26 MG tablet Take 1 tablet by mouth 2 (Two) Times a Day. 180 tablet 3   • [DISCONTINUED] ezetimibe (ZETIA) 10 MG tablet Take 1 tablet by mouth Daily. 30 tablet 11   • [DISCONTINUED] Farxiga 10 MG tablet Take 10 mg by mouth Daily. 30 tablet 11     No current facility-administered medications on file prior to visit.       Other    Past Medical History:   Diagnosis Date   • Hyperlipidemia    • Hypertension    • Kidney stones    • Seizures    • Sleep apnea        Social History     Socioeconomic History   • Marital status:    Tobacco Use   • Smoking status: Never   • Smokeless tobacco: Never   Vaping Use   • Vaping status: Never Used   Substance and Sexual Activity   • Alcohol use: Yes     Comment: occas socially   • Drug use: Never   • Sexual activity: Defer       Family History   Problem Relation Age of Onset   • No Known Problems Mother    • No Known Problems Father        Review of Systems   Constitutional: Negative.  Negative for activity change,  appetite change, chills, fatigue and fever.   HENT: Negative.  Negative for congestion, sinus pressure and sinus pain.    Eyes: Negative.  Negative for visual disturbance.   Respiratory:  Positive for apnea and shortness of breath. Negative for cough, chest tightness and wheezing.    Cardiovascular: Negative.  Negative for chest pain, palpitations and leg swelling.   Gastrointestinal: Negative.  Negative for blood in stool.   Endocrine: Negative.  Negative for cold intolerance and heat intolerance.   Genitourinary: Negative.  Negative for hematuria.   Musculoskeletal: Negative.  Negative for gait problem.   Skin: Negative.  Negative for color change, rash and wound.   Allergic/Immunologic: Negative.  Negative for environmental allergies and food allergies.   Neurological:  Negative for dizziness, syncope, weakness, light-headedness, numbness and headaches.   Hematological:  Bruises/bleeds easily.   Psychiatric/Behavioral: Negative.  Negative for sleep disturbance.        Objective  Vitals:    12/18/24 1133   BP: 128/67   BP Location: Right arm   Patient Position: Sitting   Cuff Size: Adult   Pulse: 64   SpO2: 96%   Weight: 83.9 kg (185 lb)      /67 (BP Location: Right arm, Patient Position: Sitting, Cuff Size: Adult)   Pulse 64   Wt 83.9 kg (185 lb)   SpO2 96%   BMI 25.09 kg/m²     Lab Results (most recent)       None            Physical Exam  Vitals and nursing note reviewed.   Constitutional:       General: He is not in acute distress.     Appearance: Normal appearance. He is well-developed.   HENT:      Head: Normocephalic and atraumatic.   Eyes:      General: No scleral icterus.        Right eye: No discharge.         Left eye: No discharge.      Conjunctiva/sclera: Conjunctivae normal.   Neck:      Vascular: No carotid bruit.   Cardiovascular:      Rate and Rhythm: Normal rate and regular rhythm.      Heart sounds: Normal heart sounds. No murmur heard.     No friction rub. No gallop.   Pulmonary:       Effort: Pulmonary effort is normal. No respiratory distress.      Breath sounds: Normal breath sounds. No wheezing or rales.   Chest:      Chest wall: No tenderness.   Musculoskeletal:      Right lower leg: No edema.      Left lower leg: No edema.   Skin:     General: Skin is warm and dry.      Coloration: Skin is not pale.      Findings: No erythema or rash.   Neurological:      Mental Status: He is alert and oriented to person, place, and time.      Cranial Nerves: No cranial nerve deficit.   Psychiatric:         Behavior: Behavior normal.         Procedure  Procedures       Assessment & Plan    Problems Addressed this Visit          Cardiac and Vasculature    Cardiomyopathy, dilated    Coronary artery disease involving native coronary artery     Other Visit Diagnoses       Chronic systolic congestive heart failure    -  Primary          Diagnoses         Codes Comments    Chronic systolic congestive heart failure    -  Primary ICD-10-CM: I50.22  ICD-9-CM: 428.22, 428.0     Cardiomyopathy, dilated     ICD-10-CM: I42.0  ICD-9-CM: 425.4     Coronary artery disease involving native coronary artery, unspecified whether angina present, unspecified whether native or transplanted heart     ICD-10-CM: I25.10  ICD-9-CM: 414.01             Recommendations  1.  Patient is a 74-year-old male presenting to the office for evaluation post bypass.  He is doing remarkably well.  He does not describe any angina.  He does have persistent mild to moderate levels of dyspnea.  He appears euvolemic on exam.  For now, we can monitor.    2.  Patient with dilated cardiomyopathy on beta-blocker as well as Entresto.  We are limited in regards to titrating therapy because of baseline hypotension.  Baseline EF is at 40±5% by recent echo.  We can continue to monitor.    3.  Patient is euvolemic on examination today.  We will monitor and continue medical therapy.    4.  We will see him back for follow-up in 6 months.  Patient has labs performed  by primary.  We recommend an LDL goal less than 70.  Follow-up with primary as scheduled.  Follow-up in our office in 6 months or sooner for symptoms as discussed.       Patient brought in medicine list to appointment, it's been reviewed with patient and med list was updated in the chart.      Advance Care Planning  ACP discussion was declined by the patient. Patient has an advance directive (not in EMR), copy requested.        Electronically signed by:

## 2025-05-06 ENCOUNTER — TELEPHONE (OUTPATIENT)
Dept: CARDIOLOGY | Facility: CLINIC | Age: 75
End: 2025-05-06
Payer: MEDICARE

## 2025-05-06 NOTE — TELEPHONE ENCOUNTER
Received cardiac clearance request from DR MARIO CAMPUZANO stating pt has RA LAP BILATERAL INGUINAL HERNIA scheduled for 05/16/2025 and is requiring a cardiac clearance. Placed cardiac clearance request in ROSSI'S inbox to review and address with provider.

## 2025-05-06 NOTE — LETTER
May 8, 2025           To Whom It May Concern:    We build our practice on integrity and patient care. The highest compliment we can receive is the referral of friends, family and patients to our office. We want to take this time to thank you for considering our group as part of your care team.    The medical records for Jevon Cotto 1950 were reviewed for pre-operative clearance on 05/08/25. It has been determined that this patient has: INCREASED BUT NOT PROHIBITIVE RISK. W/ HX OF CHF. CLOSE TELEMETRY MONITORING AND VOLUME STATUS.    Jevon Cotto is currently on the following medications that will need to be held prior to surgery: CONTINUE ASA.    This pre-operative evaluation has been completed based on patient reported medical conditions at the date of this letter, this evaluation may have considered in part results of additional testing, completion of an EKG and patient reported symptoms at the time of their visit.    Jevon Cotto's pre-operative clearance is good for thirty(30) days from the date of this letter with no reported changes in health, symptoms or diagnosis from the patient, their primary care provider or your office.    Your office has reported the patient will under go FOR RA LAP BILATERAL INGUINAL HERNIA on 05/16/25 with Dr. CAMPUZANO. If this appointment is changed or moved outside of thirty(30) days from 05/08/25 this pre-operative clearance is void.    If you have any further questions please give our office a call.    Thank you for your trust,      JIMY Alcala

## 2025-06-23 ENCOUNTER — OFFICE VISIT (OUTPATIENT)
Dept: CARDIOLOGY | Facility: CLINIC | Age: 75
End: 2025-06-23
Payer: MEDICARE

## 2025-06-23 VITALS
WEIGHT: 181 LBS | OXYGEN SATURATION: 98 % | HEART RATE: 50 BPM | HEIGHT: 72 IN | SYSTOLIC BLOOD PRESSURE: 127 MMHG | BODY MASS INDEX: 24.52 KG/M2 | DIASTOLIC BLOOD PRESSURE: 76 MMHG

## 2025-06-23 DIAGNOSIS — R00.1 BRADYCARDIA, SINUS: ICD-10-CM

## 2025-06-23 DIAGNOSIS — I25.10 CORONARY ARTERY DISEASE INVOLVING NATIVE CORONARY ARTERY, UNSPECIFIED WHETHER ANGINA PRESENT, UNSPECIFIED WHETHER NATIVE OR TRANSPLANTED HEART: ICD-10-CM

## 2025-06-23 DIAGNOSIS — I50.22 CHRONIC SYSTOLIC CONGESTIVE HEART FAILURE: ICD-10-CM

## 2025-06-23 DIAGNOSIS — I42.0 CARDIOMYOPATHY, DILATED: Primary | ICD-10-CM

## 2025-06-23 PROCEDURE — 3078F DIAST BP <80 MM HG: CPT | Performed by: PHYSICIAN ASSISTANT

## 2025-06-23 PROCEDURE — 99214 OFFICE O/P EST MOD 30 MIN: CPT | Performed by: PHYSICIAN ASSISTANT

## 2025-06-23 PROCEDURE — 3074F SYST BP LT 130 MM HG: CPT | Performed by: PHYSICIAN ASSISTANT

## 2025-06-23 RX ORDER — METOPROLOL SUCCINATE 25 MG/1
12.5 TABLET, EXTENDED RELEASE ORAL DAILY
Qty: 90 TABLET | Refills: 3 | Status: SHIPPED | OUTPATIENT
Start: 2025-06-23

## 2025-06-23 NOTE — PROGRESS NOTES
Problem list     Subjective   Jevon Cotto is a 75 y.o. male     Chief Complaint   Patient presents with    6 month follow up     CAD     Problem list  1.  Coronary artery disease  1.1 cardiac catheterization November 2023 due to cardiomyopathy demonstrated complex distal left main disease and high-grade ostial LAD disease.  No obstructive disease otherwise but recommendations for coronary artery bypass grafting.  1.2 patient being evaluated at [facility].  1.3 coronary artery bypass grafting April 2024 with LIMA to LAD, vein graft to ramus, and vein graft to diagonal  2.  Questionable VSD  2.1 echocardiogram October 2023 suggestive of a muscular VSD  2.2 follow-up cardiac MRI December 2024 with no definitive VSD noted  2.3 repeat transthoracic echocardiogram June 2024 with no evidence of VSD noted  3.  Dilated cardiomyopathy  3.1 EF estimated at 30 to 35%  3.2 EF at 41% by cardiac MRI December 2023  3.3 EF estimated at 50% according to surgeon operative note at the end of bypass grafting  3.4 EF estimated at 40± percent by echocardiogram June 2024  4.  Valvular heart disease  4.1 mild TR and mild to moderate MR by cardiac MRI  5.  Chronic systolic and diastolic heart failure  6.  Dyslipidemia  7.  Seizure disorder  8.  Hypertension  9.  Pleural effusion  9.1 VATS procedure performed through [facility] in 2024    HPI    Patient is a 75-year-old male presenting for routine cardiac feeling remarkably well.  Patient he has no chest pain or pressure at all.  He does not describe any shortness of breath.  No complaints of PND or orthopnea.    He does not describe palpitating nor does he describe any dysrhythmic symptoms.    Clinically, he appears to be doing well.  Patient has history of coronary disease with bypass grafting and cardiomyopathy with mild to moderate valvular disease.  He feels well.  Overall, he has done remarkably well      Current Outpatient Medications on File Prior to Visit    Medication Sig Dispense Refill    aspirin 81 MG EC tablet Take 1 tablet by mouth Daily. 30 tablet 5    atorvastatin (LIPITOR) 40 MG tablet Take 1 tablet by mouth Daily.      furosemide (LASIX) 20 MG tablet Take 1 tablet by mouth Daily As Needed (edema). 90 tablet 3    lamoTRIgine (LaMICtal) 150 MG tablet Take 1 tablet by mouth Daily.      lamoTRIgine (LaMICtal) 200 MG tablet Take 1 tablet by mouth Daily.      multivitamin (THERAGRAN) tablet tablet Take  by mouth Daily.      sacubitril-valsartan (ENTRESTO) 24-26 MG tablet Take 1 tablet by mouth 2 (Two) Times a Day. 180 tablet 3     No current facility-administered medications on file prior to visit.       Other    Past Medical History:   Diagnosis Date    Hyperlipidemia     Hypertension     Kidney stones     Seizures     Sleep apnea        Social History     Socioeconomic History    Marital status:    Tobacco Use    Smoking status: Never    Smokeless tobacco: Never   Vaping Use    Vaping status: Never Used   Substance and Sexual Activity    Alcohol use: Yes     Comment: occas socially    Drug use: Never    Sexual activity: Defer       Family History   Problem Relation Age of Onset    No Known Problems Mother     No Known Problems Father        Review of Systems   Constitutional:  Positive for fatigue. Negative for activity change, appetite change, chills and fever.   HENT: Negative.  Negative for congestion, sinus pressure and sinus pain.    Eyes: Negative.  Negative for visual disturbance.   Respiratory:  Negative for apnea, cough, chest tightness, shortness of breath and wheezing.    Cardiovascular: Negative.  Negative for chest pain, palpitations and leg swelling.   Gastrointestinal: Negative.  Negative for blood in stool.   Endocrine: Negative.  Negative for cold intolerance and heat intolerance.   Genitourinary: Negative.  Negative for hematuria.   Musculoskeletal: Negative.  Negative for gait problem.   Skin: Negative.  Negative for color change, rash  "and wound.   Allergic/Immunologic: Negative.  Negative for environmental allergies and food allergies.   Neurological:  Negative for dizziness, syncope, weakness, light-headedness, numbness and headaches.   Hematological: Negative.  Does not bruise/bleed easily.   Psychiatric/Behavioral: Negative.  Negative for sleep disturbance.        Objective   Vitals:    06/23/25 0929   BP: 127/76   BP Location: Right arm   Patient Position: Sitting   Cuff Size: Adult   Pulse: 50   SpO2: 98%   Weight: 82.1 kg (181 lb)   Height: 182.9 cm (72\")      /76 (BP Location: Right arm, Patient Position: Sitting, Cuff Size: Adult)   Pulse 50   Ht 182.9 cm (72\")   Wt 82.1 kg (181 lb)   SpO2 98%   BMI 24.55 kg/m²     Lab Results (most recent)       None            Physical Exam  Vitals and nursing note reviewed.   Constitutional:       General: He is not in acute distress.     Appearance: Normal appearance. He is well-developed.   HENT:      Head: Normocephalic and atraumatic.   Eyes:      General: No scleral icterus.        Right eye: No discharge.         Left eye: No discharge.      Conjunctiva/sclera: Conjunctivae normal.   Neck:      Vascular: No carotid bruit.   Cardiovascular:      Rate and Rhythm: Normal rate and regular rhythm.      Heart sounds: Normal heart sounds. No murmur heard.     No friction rub. No gallop.   Pulmonary:      Effort: Pulmonary effort is normal. No respiratory distress.      Breath sounds: Normal breath sounds. No wheezing or rales.   Chest:      Chest wall: No tenderness.   Musculoskeletal:      Right lower leg: No edema.      Left lower leg: No edema.   Skin:     General: Skin is warm and dry.      Coloration: Skin is not pale.      Findings: No erythema or rash.   Neurological:      Mental Status: He is alert and oriented to person, place, and time.      Cranial Nerves: No cranial nerve deficit.   Psychiatric:         Behavior: Behavior normal.         Procedure   Procedures       Assessment & " Plan     Problems Addressed this Visit          Cardiac and Vasculature    Cardiomyopathy, dilated - Primary    Relevant Medications    metoprolol succinate XL (TOPROL-XL) 25 MG 24 hr tablet    Coronary artery disease involving native coronary artery    Relevant Medications    metoprolol succinate XL (TOPROL-XL) 25 MG 24 hr tablet    Chronic systolic congestive heart failure    Relevant Medications    metoprolol succinate XL (TOPROL-XL) 25 MG 24 hr tablet    Bradycardia, sinus    Relevant Medications    metoprolol succinate XL (TOPROL-XL) 25 MG 24 hr tablet     Diagnoses         Codes Comments      Cardiomyopathy, dilated    -  Primary ICD-10-CM: I42.0  ICD-9-CM: 425.4       Chronic systolic congestive heart failure     ICD-10-CM: I50.22  ICD-9-CM: 428.22, 428.0       Coronary artery disease involving native coronary artery, unspecified whether angina present, unspecified whether native or transplanted heart     ICD-10-CM: I25.10  ICD-9-CM: 414.01       Bradycardia, sinus     ICD-10-CM: R00.1  ICD-9-CM: 427.89           Recommendations  1.  Patient is a 75-year-old male presenting to the office for evaluation doing well.  He has known coronary disease without angina on appropriate medical therapy including antiplatelet and statin therapy.  For now, we will continue medical therapy in the absence of symptoms.    2.  I am concerned about patient's bradycardia and will decrease metoprolol because of it.  I want him to call for any issues.    3.  Patient with heart failure and cardiomyopathy.  He has done well on medical therapy.  I will continue.  He has no symptoms to suggest decompensated failure.    4.  For now, we can see him back for follow-up in 6 months or sooner if needed.  Follow-up with primary as scheduled.             Jevon Cotto  reports that he has never smoked. He has never used smokeless tobacco.     Patient did not bring med list or medicine bottles to appointment, med list has been reviewed and  updated based on patient's knowledge of their meds.      Advance Care Planning   ACP discussion was declined by the patient. Patient has an advance directive (not in EMR), copy requested.        Electronically signed by:

## 2025-06-23 NOTE — LETTER
June 30, 2025     Jeremy Krueger Jr., MD  [address] [place] [id number]    Patient: Jevon Cotto   YOB: 1950   Date of Visit: 6/23/2025       Dear Jeremy Krueger Jr., MD    Jevon Cotto was in my office today. Below is a copy of my note.    If you have questions, please do not hesitate to call me. I look forward to following Jevon along with you.         Sincerely,        JIMY Cali        CC: No Recipients    Problem list     Subjective  Jevon Cotto is a 75 y.o. male     Chief Complaint   Patient presents with   • 6 month follow up     CAD     Problem list  1.  Coronary artery disease  1.1 cardiac catheterization November 2023 due to cardiomyopathy demonstrated complex distal left main disease and high-grade ostial LAD disease.  No obstructive disease otherwise but recommendations for coronary artery bypass grafting.  1.2 patient being evaluated at [facility].  1.3 coronary artery bypass grafting April 2024 with LIMA to LAD, vein graft to ramus, and vein graft to diagonal  2.  Questionable VSD  2.1 echocardiogram October 2023 suggestive of a muscular VSD  2.2 follow-up cardiac MRI December 2024 with no definitive VSD noted  2.3 repeat transthoracic echocardiogram June 2024 with no evidence of VSD noted  3.  Dilated cardiomyopathy  3.1 EF estimated at 30 to 35%  3.2 EF at 41% by cardiac MRI December 2023  3.3 EF estimated at 50% according to surgeon operative note at the end of bypass grafting  3.4 EF estimated at 40± percent by echocardiogram June 2024  4.  Valvular heart disease  4.1 mild TR and mild to moderate MR by cardiac MRI  5.  Chronic systolic and diastolic heart failure  6.  Dyslipidemia  7.  Seizure disorder  8.  Hypertension  9.  Pleural effusion  9.1 VATS procedure performed through [facility] in 2024    HPI    Patient is a 75-year-old male presenting for routine cardiac feeling remarkably well.  Patient he has no chest pain or  pressure at all.  He does not describe any shortness of breath.  No complaints of PND or orthopnea.    He does not describe palpitating nor does he describe any dysrhythmic symptoms.    Clinically, he appears to be doing well.  Patient has history of coronary disease with bypass grafting and cardiomyopathy with mild to moderate valvular disease.  He feels well.  Overall, he has done remarkably well      Current Outpatient Medications on File Prior to Visit   Medication Sig Dispense Refill   • aspirin 81 MG EC tablet Take 1 tablet by mouth Daily. 30 tablet 5   • atorvastatin (LIPITOR) 40 MG tablet Take 1 tablet by mouth Daily.     • furosemide (LASIX) 20 MG tablet Take 1 tablet by mouth Daily As Needed (edema). 90 tablet 3   • lamoTRIgine (LaMICtal) 150 MG tablet Take 1 tablet by mouth Daily.     • lamoTRIgine (LaMICtal) 200 MG tablet Take 1 tablet by mouth Daily.     • multivitamin (THERAGRAN) tablet tablet Take  by mouth Daily.     • sacubitril-valsartan (ENTRESTO) 24-26 MG tablet Take 1 tablet by mouth 2 (Two) Times a Day. 180 tablet 3     No current facility-administered medications on file prior to visit.       Other    Past Medical History:   Diagnosis Date   • Hyperlipidemia    • Hypertension    • Kidney stones    • Seizures    • Sleep apnea        Social History     Socioeconomic History   • Marital status:    Tobacco Use   • Smoking status: Never   • Smokeless tobacco: Never   Vaping Use   • Vaping status: Never Used   Substance and Sexual Activity   • Alcohol use: Yes     Comment: occas socially   • Drug use: Never   • Sexual activity: Defer       Family History   Problem Relation Age of Onset   • No Known Problems Mother    • No Known Problems Father        Review of Systems   Constitutional:  Positive for fatigue. Negative for activity change, appetite change, chills and fever.   HENT: Negative.  Negative for congestion, sinus pressure and sinus pain.    Eyes: Negative.  Negative for visual  "disturbance.   Respiratory:  Negative for apnea, cough, chest tightness, shortness of breath and wheezing.    Cardiovascular: Negative.  Negative for chest pain, palpitations and leg swelling.   Gastrointestinal: Negative.  Negative for blood in stool.   Endocrine: Negative.  Negative for cold intolerance and heat intolerance.   Genitourinary: Negative.  Negative for hematuria.   Musculoskeletal: Negative.  Negative for gait problem.   Skin: Negative.  Negative for color change, rash and wound.   Allergic/Immunologic: Negative.  Negative for environmental allergies and food allergies.   Neurological:  Negative for dizziness, syncope, weakness, light-headedness, numbness and headaches.   Hematological: Negative.  Does not bruise/bleed easily.   Psychiatric/Behavioral: Negative.  Negative for sleep disturbance.        Objective  Vitals:    06/23/25 0929   BP: 127/76   BP Location: Right arm   Patient Position: Sitting   Cuff Size: Adult   Pulse: 50   SpO2: 98%   Weight: 82.1 kg (181 lb)   Height: 182.9 cm (72\")      /76 (BP Location: Right arm, Patient Position: Sitting, Cuff Size: Adult)   Pulse 50   Ht 182.9 cm (72\")   Wt 82.1 kg (181 lb)   SpO2 98%   BMI 24.55 kg/m²     Lab Results (most recent)       None            Physical Exam  Vitals and nursing note reviewed.   Constitutional:       General: He is not in acute distress.     Appearance: Normal appearance. He is well-developed.   HENT:      Head: Normocephalic and atraumatic.   Eyes:      General: No scleral icterus.        Right eye: No discharge.         Left eye: No discharge.      Conjunctiva/sclera: Conjunctivae normal.   Neck:      Vascular: No carotid bruit.   Cardiovascular:      Rate and Rhythm: Normal rate and regular rhythm.      Heart sounds: Normal heart sounds. No murmur heard.     No friction rub. No gallop.   Pulmonary:      Effort: Pulmonary effort is normal. No respiratory distress.      Breath sounds: Normal breath sounds. No " wheezing or rales.   Chest:      Chest wall: No tenderness.   Musculoskeletal:      Right lower leg: No edema.      Left lower leg: No edema.   Skin:     General: Skin is warm and dry.      Coloration: Skin is not pale.      Findings: No erythema or rash.   Neurological:      Mental Status: He is alert and oriented to person, place, and time.      Cranial Nerves: No cranial nerve deficit.   Psychiatric:         Behavior: Behavior normal.         Procedure  Procedures       Assessment & Plan    Problems Addressed this Visit          Cardiac and Vasculature    Cardiomyopathy, dilated - Primary    Relevant Medications    metoprolol succinate XL (TOPROL-XL) 25 MG 24 hr tablet    Coronary artery disease involving native coronary artery    Relevant Medications    metoprolol succinate XL (TOPROL-XL) 25 MG 24 hr tablet    Chronic systolic congestive heart failure    Relevant Medications    metoprolol succinate XL (TOPROL-XL) 25 MG 24 hr tablet    Bradycardia, sinus    Relevant Medications    metoprolol succinate XL (TOPROL-XL) 25 MG 24 hr tablet     Diagnoses         Codes Comments      Cardiomyopathy, dilated    -  Primary ICD-10-CM: I42.0  ICD-9-CM: 425.4       Chronic systolic congestive heart failure     ICD-10-CM: I50.22  ICD-9-CM: 428.22, 428.0       Coronary artery disease involving native coronary artery, unspecified whether angina present, unspecified whether native or transplanted heart     ICD-10-CM: I25.10  ICD-9-CM: 414.01       Bradycardia, sinus     ICD-10-CM: R00.1  ICD-9-CM: 427.89           Recommendations  1.  Patient is a 75-year-old male presenting to the office for evaluation doing well.  He has known coronary disease without angina on appropriate medical therapy including antiplatelet and statin therapy.  For now, we will continue medical therapy in the absence of symptoms.    2.  I am concerned about patient's bradycardia and will decrease metoprolol because of it.  I want him to call for any  issues.    3.  Patient with heart failure and cardiomyopathy.  He has done well on medical therapy.  I will continue.  He has no symptoms to suggest decompensated failure.    4.  For now, we can see him back for follow-up in 6 months or sooner if needed.  Follow-up with primary as scheduled.             Jevon Cotto  reports that he has never smoked. He has never used smokeless tobacco.     Patient did not bring med list or medicine bottles to appointment, med list has been reviewed and updated based on patient's knowledge of their meds.      Advance Care Planning  ACP discussion was declined by the patient. Patient has an advance directive (not in EMR), copy requested.        Electronically signed by:

## 2025-06-30 PROBLEM — I50.22 CHRONIC SYSTOLIC CONGESTIVE HEART FAILURE: Status: ACTIVE | Noted: 2025-06-30

## 2025-06-30 PROBLEM — R00.1 BRADYCARDIA, SINUS: Status: ACTIVE | Noted: 2025-06-30
